# Patient Record
Sex: FEMALE | Race: BLACK OR AFRICAN AMERICAN | NOT HISPANIC OR LATINO | Employment: FULL TIME | URBAN - METROPOLITAN AREA
[De-identification: names, ages, dates, MRNs, and addresses within clinical notes are randomized per-mention and may not be internally consistent; named-entity substitution may affect disease eponyms.]

---

## 2018-01-23 ENCOUNTER — HOSPITAL ENCOUNTER (EMERGENCY)
Facility: OTHER | Age: 28
Discharge: HOME | End: 2018-01-23
Attending: EMERGENCY MEDICINE
Payer: COMMERCIAL

## 2018-01-23 VITALS
BODY MASS INDEX: 42.51 KG/M2 | HEIGHT: 62 IN | SYSTOLIC BLOOD PRESSURE: 124 MMHG | DIASTOLIC BLOOD PRESSURE: 72 MMHG | RESPIRATION RATE: 18 BRPM | WEIGHT: 231 LBS | OXYGEN SATURATION: 100 % | HEART RATE: 118 BPM | TEMPERATURE: 98 F

## 2018-01-23 DIAGNOSIS — J40 BRONCHITIS: ICD-10-CM

## 2018-01-23 DIAGNOSIS — E87.6 HYPOKALEMIA: ICD-10-CM

## 2018-01-23 DIAGNOSIS — J18.9 PNEUMONIA OF LEFT LOWER LOBE DUE TO INFECTIOUS ORGANISM: Primary | ICD-10-CM

## 2018-01-23 LAB
ALBUMIN SERPL-MCNC: 3.6 G/DL (ref 3.3–5.5)
ALP SERPL-CCNC: 84 U/L (ref 42–141)
B-HCG UR QL: NEGATIVE
BILIRUB SERPL-MCNC: 1.6 MG/DL (ref 0.2–1.6)
BILIRUBIN, POC UA: NEGATIVE
BLOOD, POC UA: ABNORMAL
BUN SERPL-MCNC: 11 MG/DL (ref 7–22)
CALCIUM SERPL-MCNC: 9.2 MG/DL (ref 8–10.3)
CHLORIDE SERPL-SCNC: 103 MMOL/L (ref 98–108)
CLARITY, POC UA: CLEAR
COLOR, POC UA: YELLOW
CREAT SERPL-MCNC: 1 MG/DL (ref 0.6–1.2)
CTP QC/QA: YES
CTP QC/QA: YES
FLUAV AG NPH QL: NEGATIVE
FLUBV AG NPH QL: NEGATIVE
GLUCOSE SERPL-MCNC: 143 MG/DL (ref 73–118)
GLUCOSE, POC UA: NEGATIVE
HCO3 UR-SCNC: 27.3 MMOL/L (ref 24–28)
KETONES, POC UA: NEGATIVE
LDH SERPL L TO P-CCNC: 1.57 MMOL/L (ref 0.5–2.2)
LEUKOCYTE EST, POC UA: NEGATIVE
NITRITE, POC UA: NEGATIVE
PCO2 BLDA: 36.6 MMHG (ref 35–45)
PH SMN: 7.48 [PH] (ref 7.35–7.45)
PH UR STRIP: 5.5 [PH]
PO2 BLDA: 113 MMHG (ref 40–60)
POC ALT (SGPT): 24 U/L (ref 10–47)
POC AST (SGOT): 19 U/L (ref 11–38)
POC BE: 4 MMOL/L
POC SATURATED O2: 99 % (ref 95–100)
POC TCO2: 25 MMOL/L (ref 18–33)
POC TCO2: 28 MMOL/L (ref 24–29)
POTASSIUM BLD-SCNC: 3 MMOL/L (ref 3.6–5.1)
PROTEIN, POC UA: ABNORMAL
PROTEIN, POC: 7.8 G/DL (ref 6.4–8.1)
SAMPLE: ABNORMAL
SODIUM BLD-SCNC: 140 MMOL/L (ref 128–145)
SPECIFIC GRAVITY, POC UA: 1.01
UROBILINOGEN, POC UA: 0.2 E.U./DL

## 2018-01-23 PROCEDURE — 85025 COMPLETE CBC W/AUTO DIFF WBC: CPT

## 2018-01-23 PROCEDURE — 81025 URINE PREGNANCY TEST: CPT | Performed by: NURSE PRACTITIONER

## 2018-01-23 PROCEDURE — 96361 HYDRATE IV INFUSION ADD-ON: CPT

## 2018-01-23 PROCEDURE — 80053 COMPREHEN METABOLIC PANEL: CPT

## 2018-01-23 PROCEDURE — 81003 URINALYSIS AUTO W/O SCOPE: CPT

## 2018-01-23 PROCEDURE — 63600175 PHARM REV CODE 636 W HCPCS: Performed by: NURSE PRACTITIONER

## 2018-01-23 PROCEDURE — 87804 INFLUENZA ASSAY W/OPTIC: CPT

## 2018-01-23 PROCEDURE — 99284 EMERGENCY DEPT VISIT MOD MDM: CPT | Mod: 25

## 2018-01-23 PROCEDURE — 82803 BLOOD GASES ANY COMBINATION: CPT

## 2018-01-23 PROCEDURE — 96374 THER/PROPH/DIAG INJ IV PUSH: CPT

## 2018-01-23 PROCEDURE — 25000003 PHARM REV CODE 250: Performed by: NURSE PRACTITIONER

## 2018-01-23 RX ORDER — PROMETHAZINE HYDROCHLORIDE AND DEXTROMETHORPHAN HYDROBROMIDE 6.25; 15 MG/5ML; MG/5ML
5 SYRUP ORAL EVERY 6 HOURS PRN
Qty: 120 ML | Refills: 0 | Status: SHIPPED | OUTPATIENT
Start: 2018-01-23 | End: 2021-12-09

## 2018-01-23 RX ORDER — CEFTRIAXONE 1 G/1
INJECTION, POWDER, FOR SOLUTION INTRAMUSCULAR; INTRAVENOUS
Status: DISCONTINUED
Start: 2018-01-23 | End: 2018-01-23 | Stop reason: HOSPADM

## 2018-01-23 RX ORDER — AZITHROMYCIN 250 MG/1
250 TABLET, FILM COATED ORAL DAILY
Qty: 6 TABLET | Refills: 0 | Status: SHIPPED | OUTPATIENT
Start: 2018-01-23 | End: 2021-12-09

## 2018-01-23 RX ORDER — POTASSIUM CHLORIDE 20 MEQ/1
40 TABLET, EXTENDED RELEASE ORAL
Status: COMPLETED | OUTPATIENT
Start: 2018-01-23 | End: 2018-01-23

## 2018-01-23 RX ORDER — CEFTRIAXONE 1 G/1
1 INJECTION, POWDER, FOR SOLUTION INTRAMUSCULAR; INTRAVENOUS
Status: COMPLETED | OUTPATIENT
Start: 2018-01-23 | End: 2018-01-23

## 2018-01-23 RX ADMIN — POTASSIUM CHLORIDE 40 MEQ: 1500 TABLET, EXTENDED RELEASE ORAL at 03:01

## 2018-01-23 RX ADMIN — CEFTRIAXONE SODIUM 1 G: 1 INJECTION, POWDER, FOR SOLUTION INTRAMUSCULAR; INTRAVENOUS at 03:01

## 2018-01-23 RX ADMIN — SODIUM CHLORIDE 1000 ML: 0.9 INJECTION, SOLUTION INTRAVENOUS at 03:01

## 2018-01-23 NOTE — ED PROVIDER NOTES
"Encounter Date: 1/23/2018       History     Chief Complaint   Patient presents with    Cough     pt states "I've been having a cough, chills and body aches since yesterday"    Chills    Generalized Body Aches     The history is provided by the patient. No  was used.   Cough   This is a new problem. The current episode started yesterday. The problem occurs constantly. The problem has been gradually worsening. The cough is productive of sputum. There has been no fever. Associated symptoms include chest pain (to left chest wall when coughing only) and myalgias. Pertinent negatives include no sweats, no weight loss, no ear congestion, no ear pain, no headaches, no rhinorrhea, no sore throat, no shortness of breath, no wheezing and no eye redness. Associated symptoms comments: Body aches and chills.     Review of patient's allergies indicates:  No Known Allergies  Past Medical History:   Diagnosis Date    Genital herpes      Past Surgical History:   Procedure Laterality Date    CERVICAL DISCECTOMY       History reviewed. No pertinent family history.  Social History   Substance Use Topics    Smoking status: Never Smoker    Smokeless tobacco: Not on file    Alcohol use No     Review of Systems   Constitutional: Negative.  Negative for appetite change, fever and weight loss.   HENT: Negative.  Negative for congestion, dental problem, ear discharge, ear pain, hearing loss, mouth sores, rhinorrhea, sore throat and trouble swallowing.    Eyes: Negative.  Negative for pain, discharge and redness.   Respiratory: Positive for cough. Negative for shortness of breath and wheezing.    Cardiovascular: Positive for chest pain (to left chest wall when coughing only).   Gastrointestinal: Negative.  Negative for abdominal distention, abdominal pain, constipation, diarrhea, nausea, rectal pain and vomiting.   Endocrine: Negative.    Genitourinary: Negative.  Negative for dyspareunia, dysuria, hematuria, vaginal " bleeding, vaginal discharge and vaginal pain.   Musculoskeletal: Positive for myalgias. Negative for back pain and neck pain.   Skin: Negative.  Negative for rash.   Allergic/Immunologic: Negative.    Neurological: Negative.  Negative for facial asymmetry, speech difficulty, light-headedness and headaches.   Hematological: Negative.    Psychiatric/Behavioral: Negative.  Negative for agitation, dysphoric mood and sleep disturbance.   All other systems reviewed and are negative.      Physical Exam     Initial Vitals [01/23/18 1411]   BP Pulse Resp Temp SpO2   124/72 (!) 118 18 98.4 °F (36.9 °C) 100 %      MAP       89.33         Physical Exam    Nursing note and vitals reviewed.  Constitutional: She appears well-developed and well-nourished. She is not diaphoretic.  Non-toxic appearance. She does not appear ill. No distress.   HENT:   Head: Normocephalic and atraumatic.   Nose: Mucosal edema present. No rhinorrhea. Right sinus exhibits no maxillary sinus tenderness and no frontal sinus tenderness. Left sinus exhibits no maxillary sinus tenderness and no frontal sinus tenderness.   Mouth/Throat: Oropharynx is clear and moist. No oropharyngeal exudate.   Eyes: Conjunctivae are normal. Right eye exhibits no discharge. Left eye exhibits no discharge.   Neck: Normal range of motion.   Cardiovascular: Normal rate, regular rhythm, normal heart sounds and intact distal pulses. Exam reveals no gallop and no friction rub.    No murmur heard.  Pulmonary/Chest: Breath sounds normal. No respiratory distress. She has no wheezes. She has no rhonchi. She has no rales. She exhibits no tenderness.   Musculoskeletal: Normal range of motion.   Neurological: She is alert and oriented to person, place, and time.   Skin: Skin is warm and dry. No rash noted.   Psychiatric: She has a normal mood and affect. Her behavior is normal. Judgment and thought content normal.       Imaging Results          X-Ray Chest PA And Lateral (Final result)   Result time 01/23/18 14:59:15    Final result by Sharon Ordonez MD (01/23/18 14:59:15)                 Impression:    Left lower lobe consolidation, most suggestive of pneumonia.  Followup is recommended after symptom resolution.      Electronically signed by: SHARON ORDONEZ MD  Date:     01/23/18  Time:    14:59              Narrative:    EXAM:  2 VIEW CHEST RADIOGRAPH.     CLINICAL INDICATION:  Bronchitis.    TECHNIQUE:  PA and lateral views of the chest were obtained.     COMPARISON: none    FINDINGS: The mediastinal structures are midline.  The cardiac silhouette not enlarged.  There is ill-defined opacity at the left lung base with obscuration of the left hemidiaphragm suggesting left lower lobe pneumonia.  No osseous abnormalities are identified.                              ED Course   Procedures  Labs Reviewed   POCT URINALYSIS W/O SCOPE - Abnormal; Notable for the following:        Result Value    Glucose, UA Negative (*)     Bilirubin, UA Negative (*)     Ketones, UA Negative (*)     Blood, UA Trace-lysed (*)     Protein, UA 1+ (*)     Nitrite, UA Negative (*)     Leukocytes, UA Negative (*)     All other components within normal limits   ISTAT PROCEDURE - Abnormal; Notable for the following:     POC PH 7.482 (*)     POC PO2 113 (*)     All other components within normal limits   POCT CMP - Abnormal; Notable for the following:     POC Glucose 143 (*)     POC Potassium 3.0 (*)     Bilirubin 1.6 (*)     All other components within normal limits   POCT INFLUENZA A/B   POCT URINE PREGNANCY   POCT CBC   POCT URINALYSIS W/O SCOPE   POCT CMP             Medical Decision Making:   Initial Assessment:   Left lower lobe pneumonia, hypokalemia  Differential Diagnosis:   URI, bronchitis, influenza  Clinical Tests:   Lab Tests: Ordered and Reviewed       <> Summary of Lab: Rapid flu negative  Radiological Study: Ordered and Reviewed  ED Management:  Repeat pulse 83 BPM.  Rocephin IV and normal saline liter  bolus IV given to the patient in the ER.  40 mEq of potassium given to the patient in the ER.  The patient will be discharged home on azithromycin pack and promethazine DM cough syrup with instructions to follow-up with her primary care provider tomorrow, drink plenty of fluids to loosen secretions, take Tylenol and over-the-counter Motrin as needed for fever/body aches, rest, take over-the-counter Mucinex and return to the ER as needed if symptoms worsen or fail to improve.  The patient is also instructed to take a daily multivitamin to prevent hypokalemia.  The patient verbalized an understanding of discharge instructions and treatment plan.                   ED Course      Clinical Impression:   The primary encounter diagnosis was Pneumonia of left lower lobe due to infectious organism. Diagnoses of Bronchitis and Hypokalemia were also pertinent to this visit.                     Toussaint Battley III, P  01/23/18 3062

## 2018-03-24 ENCOUNTER — HOSPITAL ENCOUNTER (EMERGENCY)
Facility: OTHER | Age: 28
Discharge: HOME | End: 2018-03-24
Attending: EMERGENCY MEDICINE
Payer: COMMERCIAL

## 2018-03-24 VITALS
DIASTOLIC BLOOD PRESSURE: 76 MMHG | RESPIRATION RATE: 16 BRPM | SYSTOLIC BLOOD PRESSURE: 122 MMHG | OXYGEN SATURATION: 100 % | HEART RATE: 76 BPM | HEIGHT: 63 IN | BODY MASS INDEX: 44.12 KG/M2 | WEIGHT: 249 LBS | TEMPERATURE: 98 F

## 2018-03-24 DIAGNOSIS — N94.6 DYSMENORRHEA: Primary | ICD-10-CM

## 2018-03-24 LAB
B-HCG UR QL: NEGATIVE
CTP QC/QA: YES

## 2018-03-24 PROCEDURE — 99283 EMERGENCY DEPT VISIT LOW MDM: CPT

## 2018-03-24 PROCEDURE — 81025 URINE PREGNANCY TEST: CPT | Performed by: EMERGENCY MEDICINE

## 2018-03-24 RX ORDER — LABETALOL 100 MG/1
100 TABLET, FILM COATED ORAL 2 TIMES DAILY
COMMUNITY
End: 2021-12-09

## 2018-03-24 RX ORDER — FEXOFENADINE HCL 60 MG
60 TABLET ORAL DAILY
COMMUNITY

## 2018-03-24 RX ORDER — IBUPROFEN 800 MG/1
800 TABLET ORAL EVERY 6 HOURS PRN
Qty: 20 TABLET | Refills: 0 | Status: SHIPPED | OUTPATIENT
Start: 2018-03-24 | End: 2019-07-15 | Stop reason: SDUPTHER

## 2018-03-25 NOTE — ED PROVIDER NOTES
"Encounter Date: 3/24/2018       History     Chief Complaint   Patient presents with    Abdominal Pain     Pt states, " I think I may be pregnant and I might have some stomach pain."     Missed period that was supposed to come 3/16/18 but states period stated this morning.       The history is provided by the patient.   Female  Problem   Primary symptoms include vaginal bleeding.    Primary symptoms include no fever, no dysuria.   Primary symptoms comment: started period today with mild cramps.. This is a new problem. The current episode started today. The problem occurs intermittently. The problem has been waxing and waning. Pertinent negatives include no abdominal pain, no constipation, no diarrhea, no nausea, no vomiting, no frequency, no light-headedness and no dizziness.     Review of patient's allergies indicates:  No Known Allergies  Past Medical History:   Diagnosis Date    Genital herpes      Past Surgical History:   Procedure Laterality Date    CERVICAL DISCECTOMY       No family history on file.  Social History   Substance Use Topics    Smoking status: Never Smoker    Smokeless tobacco: Not on file    Alcohol use No     Review of Systems   Constitutional: Negative for fever.   Gastrointestinal: Negative for abdominal pain, constipation, diarrhea, nausea and vomiting.   Genitourinary: Positive for menstrual problem and vaginal bleeding. Negative for decreased urine volume, dysuria, frequency and hematuria.        Menstrual cramps     Neurological: Negative for dizziness, weakness and light-headedness.   All other systems reviewed and are negative.      Physical Exam     Initial Vitals [03/24/18 1843]   BP Pulse Resp Temp SpO2   (!) 153/108 88 16 98 °F (36.7 °C) 98 %      MAP       123         Physical Exam    Nursing note and vitals reviewed.  Constitutional: She appears well-developed and well-nourished. She is not diaphoretic. No distress.   HENT:   Head: Normocephalic and atraumatic. "   Mouth/Throat: Oropharynx is clear and moist. No oropharyngeal exudate.   Eyes: Conjunctivae are normal. Pupils are equal, round, and reactive to light.   Neck: Normal range of motion. Neck supple.   Cardiovascular: Normal rate and intact distal pulses.   Pulmonary/Chest: No accessory muscle usage or stridor. No tachypnea. No respiratory distress.   Abdominal: Soft. Bowel sounds are normal. She exhibits no distension. There is no tenderness. There is no rebound and no guarding.   Musculoskeletal: Normal range of motion. She exhibits no tenderness.   Neurological: She is alert and oriented to person, place, and time.   Skin: Skin is warm. Capillary refill takes less than 2 seconds.   Psychiatric: She has a normal mood and affect.         ED Course   Procedures  Labs Reviewed   POCT URINE PREGNANCY                             Labs Reviewed  Admission on 03/24/2018, Discharged on 03/24/2018   Component Date Value Ref Range Status    POC Preg Test, Ur 03/24/2018 Negative  Negative Final     Acceptable 03/24/2018 Yes   Final             Note was created using voice recognition software. Note may have occasional typographical errors that may not have been identified and edited despite good yoni initial review prior to signing.        Discharge Medications     Discharge Medication List as of 3/24/2018  9:18 PM      START taking these medications    Details   ibuprofen (ADVIL,MOTRIN) 800 MG tablet Take 1 tablet (800 mg total) by mouth every 6 (six) hours as needed for Pain., Starting Sat 3/24/2018, Print         CONTINUE these medications which have NOT CHANGED    Details   fexofenadine (ALLEGRA) 60 MG tablet Take 60 mg by mouth once daily., Historical Med      labetalol (NORMODYNE) 100 MG tablet Take 100 mg by mouth 2 (two) times daily., Historical Med      acyclovir (ZOVIRAX) 400 MG tablet Take by mouth 2 (two) times daily., Until Discontinued, Historical Med      azithromycin (Z-SALUD) 250 MG tablet Take  "1 tablet (250 mg total) by mouth once daily. Take first 2 tablets together, then 1 every day until finished., Starting Tue 1/23/2018, Print      famotidine (PEPCID) 20 MG tablet Take 1 tablet (20 mg total) by mouth 2 (two) times daily as needed for Heartburn., Starting 4/17/2016, Until Mon 4/17/17, Print      NORETHINDRONE-E.ESTRADIOL-IRON (LO LOESTRIN FE ORAL) Take by mouth., Until Discontinued, Historical Med      promethazine-dextromethorphan (PROMETHAZINE-DM) 6.25-15 mg/5 mL Syrp Take 5 mLs by mouth every 6 (six) hours as needed (cough)., Starting Tue 1/23/2018, Print                 Vitals:    03/24/18 1843 03/24/18 2136   BP: (!) 153/108 122/76   BP Location: Right arm    Patient Position: Sitting    Pulse: 88 76   Resp: 16    Temp: 98 °F (36.7 °C) 98.2 °F (36.8 °C)   TempSrc: Oral    SpO2: 98% 100%   Weight: 112.9 kg (249 lb)    Height: 5' 3" (1.6 m)          Patient discharged to home in stable condition with instructions to:   1. Please take all meds as prescribed.  2. Follow-up with your primary care doctor   3. Return precautions discussed and patient and/or family/caretaker understands to return to the emergency room for any concerns including worsening of your current symptoms, fever, chills, night sweats, worsening pain, chest pain, shortness of breath, nausea, vomiting, diarrhea, bleeding, headache, difficulty talking, visual disturbances, weakness, numbness or any other acute concerns       Clinical Impression:   The encounter diagnosis was Dysmenorrhea.                           Murphy Millan MD  04/29/18 1440    "

## 2018-03-25 NOTE — ED NOTES
After Visit Summary   7/11/2017    Letty High    MRN: [id number]           Patient Information     Date Of Birth          1992        Visit Information        Provider Department      7/11/2017 5:30 PM Debo Richardson APRN CNP [facility]        Today's Diagnoses     Oral contraceptive pill surveillance    -  1      Care Instructions    Primary [facility] Center Medication Refill Request Information:  * Please contact your pharmacy regarding ANY request for medication refills.  ** [facility] Prescription Fax = [phone]  * Please allow 3 business days for routine medication refills.  * Please allow 5 business days for controlled substance medication refills.     Primary [facility] Center Test Result notification information:  *You will be notified with in 7-10 days of your appointment day regarding the results of your test.  If you are on MyChart you will be notified as soon as the provider has reviewed the results and signed off on them.    Primary Care Center [phone]             Follow-ups after your visit        Your next 10 appointments already scheduled     Aug 14, 2017  2:30 PM CDT   Nurse Visit with  [facility] Nurse   [facility] ([facility])    [address] [place] [id number] [phone]              Who to contact     Please call your clinic at [phone] to:    Ask questions about your health    Make or cancel appointments    Discuss your medicines    Learn about your test results    Speak to your doctor   If you have compliments or concerns about an experience at your clinic, or if you wish to file a complaint, please contact [facility] Patient Relations at [phone] or email us at [email or web address]         Additional Information About Your Visit        MyChart Information     [email or web address] is an electronic gateway that provides easy, online access to your  Pt states that she had some mild pelvic pain and didn't know if she might be pregnant because she had a period with her last baby. Pt's period came down last night. Denies any urinary symptoms, denies constipation, and denies pain at this time.   medical records. With [facility], you can request a clinic appointment, read your test results, renew a prescription or communicate with your care team.     To sign up for [facility] visit the website at [email or web address]   You will be asked to enter the access code listed below, as well as some personal information. Please follow the directions to create your username and password.     Your access code is: [email or web address]  Expires: 2017  6:30 AM     Your access code will  in 90 days. If you need help or a new code, please contact your [facility] or call [phone] for assistance.        Care EveryWhere ID     This is your Care EveryWhere ID. This could be used by other organizations to access your [facility] medical records  [id number]        Your Vitals Were     Pulse Breastfeeding?                76 No           Blood Pressure from Last 3 Encounters:   17 126/85   17 133/76   17 119/79    Weight from Last 3 Encounters:   17 77.8 kg (171 lb 9.6 oz)   17 77.8 kg (171 lb 9.6 oz)              Today, you had the following     No orders found for display         Where to get your medicines      These medications were sent to [facility]/pharmacy #[id number] - [place], [place] -  [address], [place] [address]     Phone:  [phone]     norethindrone-ethinyl estradiol 1-20 MG-MCG per tablet          Primary Care Provider Office Phone # Fax #    Debo Amita Richardson, APRN [facility] [phone]       [address] [address]        Equal Access to Services     [facility] CenterKIRSTIE : Hadii jessica Carlton, wadeniseda luqadaha, qanaomyta kaalmada dane robles . So [facility] [phone].    ATENCIÓN: Si habla español, tiene a howell disposición servicios gratuitos de asistencia lingüística. Llame al [phone].    We comply with applicable federal civil rights laws  and [place] laws. We do not discriminate on the basis of race, color, national origin, age, disability sex, sexual orientation or gender identity.            Thank you!     Thank you for choosing [facility]  for your care. Our goal is always to provide you with excellent care. Hearing back from our patients is one way we can continue to improve our services. Please take a few minutes to complete the written survey that you may receive in the mail after your visit with us. Thank you!             Your Updated Medication List - Protect others around you: Learn how to safely use, store and throw away your medicines at www.disposemymeds.org.          This list is accurate as of: 7/11/17  5:52 PM.  Always use your most recent med list.                   Brand Name Dispense Instructions for use Diagnosis    busPIRone 10 MG tablet    BUSPAR    180 tablet    Take 1 tablet (10 mg) by mouth 2 times daily    Generalized anxiety disorder       citalopram 20 MG tablet    celeXA    90 tablet    Take 1 tablet (20 mg) by mouth daily    Severe episode of recurrent major depressive disorder, without psychotic features (H), Generalized anxiety disorder       norethindrone-ethinyl estradiol 1-20 MG-MCG per tablet    MICROGESTIN 1/20    63 tablet    Take 1 tablet by mouth daily    Oral contraceptive pill surveillance

## 2018-05-06 ENCOUNTER — HOSPITAL ENCOUNTER (EMERGENCY)
Facility: HOSPITAL | Age: 28
Discharge: HOME | End: 2018-05-06
Attending: EMERGENCY MEDICINE
Payer: COMMERCIAL

## 2018-05-06 VITALS
OXYGEN SATURATION: 99 % | BODY MASS INDEX: 42.88 KG/M2 | TEMPERATURE: 98 F | DIASTOLIC BLOOD PRESSURE: 97 MMHG | WEIGHT: 233 LBS | SYSTOLIC BLOOD PRESSURE: 140 MMHG | HEART RATE: 100 BPM | RESPIRATION RATE: 16 BRPM | HEIGHT: 62 IN

## 2018-05-06 DIAGNOSIS — R10.9 ABDOMINAL CRAMPING: ICD-10-CM

## 2018-05-06 DIAGNOSIS — R19.7 DIARRHEA, UNSPECIFIED TYPE: Primary | ICD-10-CM

## 2018-05-06 LAB
B-HCG UR QL: NEGATIVE
CTP QC/QA: YES

## 2018-05-06 PROCEDURE — 99283 EMERGENCY DEPT VISIT LOW MDM: CPT

## 2018-05-06 PROCEDURE — 81025 URINE PREGNANCY TEST: CPT | Performed by: EMERGENCY MEDICINE

## 2018-05-06 PROCEDURE — 25000003 PHARM REV CODE 250: Performed by: EMERGENCY MEDICINE

## 2018-05-06 RX ORDER — DICYCLOMINE HYDROCHLORIDE 20 MG/1
20 TABLET ORAL EVERY 6 HOURS
Qty: 30 TABLET | Refills: 0 | Status: SHIPPED | OUTPATIENT
Start: 2018-05-06 | End: 2021-12-09

## 2018-05-06 RX ORDER — DICYCLOMINE HYDROCHLORIDE 10 MG/1
20 CAPSULE ORAL
Status: COMPLETED | OUTPATIENT
Start: 2018-05-06 | End: 2018-05-06

## 2018-05-06 RX ORDER — DIPHENOXYLATE HYDROCHLORIDE AND ATROPINE SULFATE 2.5; .025 MG/1; MG/1
1 TABLET ORAL
Status: COMPLETED | OUTPATIENT
Start: 2018-05-06 | End: 2018-05-06

## 2018-05-06 RX ADMIN — DIPHENOXYLATE HYDROCHLORIDE AND ATROPINE SULFATE 1 TABLET: 2.5; .025 TABLET ORAL at 01:05

## 2018-05-06 RX ADMIN — DICYCLOMINE HYDROCHLORIDE 20 MG: 10 CAPSULE ORAL at 01:05

## 2018-05-06 NOTE — ED NOTES
Neuro: AAOx3  Resp: Airway patent, respirations even/unlabored  Cardiac: Skin pink/warm/dry, pulses intact  Abdomen: Soft, non-tender to palpation  Musculoskeletal: Moves all extremities equally, ROM intact  Pt reports diarrhea since Friday and some nausea today, denies pain

## 2018-06-29 ENCOUNTER — HOSPITAL ENCOUNTER (EMERGENCY)
Facility: HOSPITAL | Age: 28
Discharge: HOME | End: 2018-06-29
Attending: EMERGENCY MEDICINE
Payer: COMMERCIAL

## 2018-06-29 VITALS
HEART RATE: 61 BPM | WEIGHT: 229 LBS | SYSTOLIC BLOOD PRESSURE: 103 MMHG | BODY MASS INDEX: 40.57 KG/M2 | RESPIRATION RATE: 18 BRPM | OXYGEN SATURATION: 99 % | TEMPERATURE: 98 F | DIASTOLIC BLOOD PRESSURE: 61 MMHG | HEIGHT: 63 IN

## 2018-06-29 DIAGNOSIS — S70.02XA CONTUSION OF LEFT HIP, INITIAL ENCOUNTER: ICD-10-CM

## 2018-06-29 DIAGNOSIS — M54.32 SCIATICA, LEFT SIDE: ICD-10-CM

## 2018-06-29 DIAGNOSIS — B35.3 TINEA PEDIS, RIGHT: Primary | ICD-10-CM

## 2018-06-29 PROCEDURE — 99283 EMERGENCY DEPT VISIT LOW MDM: CPT

## 2018-06-29 RX ORDER — KETOROLAC TROMETHAMINE 10 MG/1
10 TABLET, FILM COATED ORAL EVERY 6 HOURS
Qty: 20 TABLET | Refills: 0 | Status: SHIPPED | OUTPATIENT
Start: 2018-06-29 | End: 2021-12-09

## 2018-06-29 RX ORDER — KETOCONAZOLE 20 MG/G
CREAM TOPICAL 2 TIMES DAILY
Qty: 60 G | Refills: 0 | Status: SHIPPED | OUTPATIENT
Start: 2018-06-29 | End: 2021-12-09

## 2018-06-29 NOTE — ED PROVIDER NOTES
Encounter Date: 6/29/2018       History     Chief Complaint   Patient presents with    Leg Pain     pt reports bilat leg pain since falling 1 month ago, reports right foot swelling since yesterday    Foot Pain     Chief complaint:  Toes  swelling  27-year-old says that her toes were swelling yesterday on her right foot but today the swelling has resolved.  She denies trauma. She says that she wears the same shoes every day.  She denies weakness or numbness or color change.  Patient also has occasional pain to her left buttock that radiates down her leg.  This has been ongoing since she fell a month ago after she tripped.  Patient did not go to the emergency room at that time.  She has been ambulating without difficulty.  She did not have pain to the site today.      The history is provided by the patient.     Review of patient's allergies indicates:  No Known Allergies  Past Medical History:   Diagnosis Date    Genital herpes      Past Surgical History:   Procedure Laterality Date    CERVICAL DISCECTOMY       History reviewed. No pertinent family history.  Social History   Substance Use Topics    Smoking status: Never Smoker    Smokeless tobacco: Not on file    Alcohol use No     Review of Systems   Musculoskeletal: Negative for gait problem and neck pain.        Left leg pain and right foot swelling   Skin: Negative for color change.   Neurological: Negative for weakness, numbness and headaches.       Physical Exam     Initial Vitals [06/29/18 1141]   BP Pulse Resp Temp SpO2   103/61 61 18 98 °F (36.7 °C) 99 %      MAP       --         Physical Exam    Constitutional: No distress.   Cardiovascular: Intact distal pulses.   Pulmonary/Chest: No respiratory distress.   Musculoskeletal: Normal range of motion. She exhibits no edema or tenderness.        Legs:       Feet:    No tenderness to right foot, FROM left hip   Neurological: She is alert and oriented to person, place, and time. She has normal strength. No  sensory deficit.   Skin: No erythema.   Psychiatric: She has a normal mood and affect.         ED Course   Procedures  Labs Reviewed - No data to display       Imaging Results    None          Medical Decision Making:   Initial Assessment:   27-year-old who complains of a swelling to her right foot that occurred yesterday and has resolved.  On exam patient has evidence of tinea Pedia to the right foot.  She also complained of pain to her left hip for the last month.  Patient has full range of motion and only mild tenderness to her buttock area.  No neurovascular deficits  ED Management:  I suspect patient has tinea pedis to the right foot as well as mild and occasional sciatica to the left hip.  Patient will be treated with Nizoral cream and Toradol.                      Clinical Impression:   The primary encounter diagnosis was Tinea pedis, right. Diagnoses of Contusion of left hip, initial encounter and Sciatica, left side were also pertinent to this visit.                             Autumn Epperson MD  06/29/18 0545

## 2019-03-12 ENCOUNTER — HOSPITAL ENCOUNTER (EMERGENCY)
Facility: HOSPITAL | Age: 29
Discharge: HOME | End: 2019-03-12
Attending: EMERGENCY MEDICINE
Payer: COMMERCIAL

## 2019-03-12 VITALS
TEMPERATURE: 98 F | OXYGEN SATURATION: 98 % | DIASTOLIC BLOOD PRESSURE: 88 MMHG | BODY MASS INDEX: 40.74 KG/M2 | RESPIRATION RATE: 16 BRPM | WEIGHT: 230 LBS | HEART RATE: 90 BPM | SYSTOLIC BLOOD PRESSURE: 140 MMHG

## 2019-03-12 DIAGNOSIS — L73.9 FOLLICULITIS: Primary | ICD-10-CM

## 2019-03-12 LAB
B-HCG UR QL: NEGATIVE
CTP QC/QA: YES

## 2019-03-12 PROCEDURE — 81025 URINE PREGNANCY TEST: CPT | Mod: ER | Performed by: EMERGENCY MEDICINE

## 2019-03-12 PROCEDURE — 99283 EMERGENCY DEPT VISIT LOW MDM: CPT | Mod: 25,ER

## 2019-03-12 RX ORDER — ERYTHROMYCIN 5 MG/G
OINTMENT OPHTHALMIC
Qty: 1 TUBE | Refills: 0 | Status: SHIPPED | OUTPATIENT
Start: 2019-03-12 | End: 2019-04-20 | Stop reason: SDUPTHER

## 2019-03-12 NOTE — ED PROVIDER NOTES
"Encounter Date: 3/12/2019       History     Chief Complaint   Patient presents with    Eye Pain     Pt states," I had congestion over the weekend and sunday my eyes started hurting and swelling with pain. I see fine they just hurt."     Patient presents with pain and swelling to right upper eyelid where she appears to have folliculitis.  Patient states that started swelling a couple days ago after she had a  sinus infection.  Patient states everything else has cleared up but I will it is still inflamed          Review of patient's allergies indicates:  No Known Allergies  Past Medical History:   Diagnosis Date    Genital herpes      Past Surgical History:   Procedure Laterality Date    CERVICAL DISCECTOMY       History reviewed. No pertinent family history.  Social History     Tobacco Use    Smoking status: Never Smoker    Smokeless tobacco: Never Used   Substance Use Topics    Alcohol use: No    Drug use: No     Review of Systems   Constitutional: Negative for fever.   HENT: Negative for sore throat.    Eyes: Positive for pain and redness.   Respiratory: Negative for shortness of breath.    Cardiovascular: Negative for chest pain.   Gastrointestinal: Negative for nausea.   Genitourinary: Negative for dysuria.   Musculoskeletal: Negative for back pain.   Skin: Negative for rash.   Neurological: Negative for weakness.   Hematological: Does not bruise/bleed easily.   All other systems reviewed and are negative.      Physical Exam     Initial Vitals [03/12/19 1141]   BP Pulse Resp Temp SpO2   (!) 140/88 90 16 98 °F (36.7 °C) 98 %      MAP       --         Physical Exam    Nursing note and vitals reviewed.  Constitutional: Vital signs are normal. She appears well-developed and well-nourished. She is not diaphoretic. She is cooperative.   HENT:   Head: Normocephalic and atraumatic.   Right Ear: External ear normal.   Left Ear: External ear normal.   Nose: Nose normal.   Mouth/Throat: Oropharynx is clear and moist. "   Eyes: Conjunctivae, EOM and lids are normal. Pupils are equal, round, and reactive to light. Right eye exhibits no discharge. No scleral icterus.   Swelling noted to right upper eyelid were patient appears to have folliculitis.  No discharge noted   Neck: Trachea normal, normal range of motion and full passive range of motion without pain. Neck supple. No thyromegaly present. No tracheal deviation and normal range of motion present. No neck rigidity. No Brudzinski's sign noted. No JVD present.   Cardiovascular: Normal rate, regular rhythm, normal heart sounds, intact distal pulses and normal pulses. Exam reveals no gallop and no friction rub.    No murmur heard.  Pulmonary/Chest: Effort normal and breath sounds normal. No stridor. No tachypnea. No respiratory distress. She has no wheezes. She has no rhonchi. She has no rales. She exhibits no tenderness.   Abdominal: Soft. Normal appearance and bowel sounds are normal. She exhibits no distension and no mass. There is no tenderness. There is no rebound and no guarding.   Musculoskeletal: Normal range of motion. She exhibits no edema or tenderness.   Lymphadenopathy:     She has no cervical adenopathy.     She has no axillary adenopathy.   Neurological: She is alert and oriented to person, place, and time. She has normal strength and normal reflexes.   Skin: Skin is warm, dry and intact. Capillary refill takes less than 2 seconds. No rash noted. No erythema.   Psychiatric: She has a normal mood and affect. Her speech is normal and behavior is normal. Judgment and thought content normal. Cognition and memory are normal.         ED Course   Procedures  Labs Reviewed   POCT URINE PREGNANCY          Imaging Results    None          Medical Decision Making:   Differential Diagnosis:   Folliculitis, conjunctivitis, pinkeye                      Clinical Impression:       ICD-10-CM ICD-9-CM   1. Folliculitis L73.9 704.8                                Brinda Santos,  [facility]  03/12/19 1227

## 2019-03-13 ENCOUNTER — HOSPITAL ENCOUNTER (EMERGENCY)
Facility: HOSPITAL | Age: 29
Discharge: HOME | End: 2019-03-13
Attending: EMERGENCY MEDICINE
Payer: COMMERCIAL

## 2019-03-13 VITALS
HEIGHT: 63 IN | BODY MASS INDEX: 40.75 KG/M2 | OXYGEN SATURATION: 100 % | HEART RATE: 73 BPM | DIASTOLIC BLOOD PRESSURE: 91 MMHG | WEIGHT: 230 LBS | RESPIRATION RATE: 18 BRPM | TEMPERATURE: 99 F | SYSTOLIC BLOOD PRESSURE: 145 MMHG

## 2019-03-13 DIAGNOSIS — H00.11 CHALAZION OF RIGHT UPPER EYELID: Primary | ICD-10-CM

## 2019-03-13 PROCEDURE — 99283 EMERGENCY DEPT VISIT LOW MDM: CPT | Mod: ER

## 2019-03-13 RX ORDER — CEPHALEXIN 250 MG/1
250 CAPSULE ORAL 4 TIMES DAILY
Qty: 28 CAPSULE | Refills: 0 | Status: SHIPPED | OUTPATIENT
Start: 2019-03-13 | End: 2019-03-20

## 2019-03-13 NOTE — DISCHARGE INSTRUCTIONS
Wash your eyelid  gently with Harshad's Baby shampoo 2-3 times a day, apply warm compresses for 10 min and then apply ointment.  Call the eye doctor for close follow-up

## 2019-03-13 NOTE — ED PROVIDER NOTES
Encounter Date: 3/13/2019       History     Chief Complaint   Patient presents with    Recheck     pt reports she was treated here yest for right eye inflam. was given ointment. She is concerned about more swelling     Eye Problem     Chief complaint:  Eyelid swelling  28-year-old complains of swelling to her right upper lid.  Patient was seen here yesterday and placed on erythromycin eye ointment.  Patient has been washing her eyelid with the Baby shampoo and then applying the ointment.  She said that her pain is minimal and only hurts when she touches it.  She denies visual changes.  Patient said that she used new eye makeup recently and the problem began 2 days ago.  She denies drainage from the eye.          Review of patient's allergies indicates:  No Known Allergies  Past Medical History:   Diagnosis Date    Genital herpes      Past Surgical History:   Procedure Laterality Date    CERVICAL DISCECTOMY       History reviewed. No pertinent family history.  Social History     Tobacco Use    Smoking status: Never Smoker    Smokeless tobacco: Never Used   Substance Use Topics    Alcohol use: No    Drug use: No     Review of Systems   Constitutional: Negative for fever.   Eyes: Positive for pain (3/10). Negative for photophobia, discharge, redness, itching and visual disturbance.        Eyelid swelling       Physical Exam     Initial Vitals [03/13/19 0709]   BP Pulse Resp Temp SpO2   (!) 145/91 73 18 99.4 °F (37.4 °C) 100 %      MAP       --         Physical Exam    Nursing note and vitals reviewed.  Constitutional: No distress.   HENT:   Head: Normocephalic.   Eyes: Conjunctivae and EOM are normal. Pupils are equal, round, and reactive to light. Right eye exhibits no discharge.       Neck: Normal range of motion.   Pulmonary/Chest: No respiratory distress.   Neurological: She is alert and oriented to person, place, and time. GCS score is 15. GCS eye subscore is 4. GCS verbal subscore is 5. GCS motor subscore  is 6.   Skin: Skin is warm.   Psychiatric: She has a normal mood and affect.         ED Course   Procedures  Labs Reviewed - No data to display       Imaging Results    None          Medical Decision Making:   Initial Assessment:   28-year-old presents with swelling to her upper lid on the right.  Patient is currently on erythromycin eye ointment.  Conjunctivae normal  ED Management:  Patient was advised not only to wash I gently with Harshad's Baby shampoo but then to apply warm compresses for 10 min and then reapply the ointment.  She should also begin Keflex.                      Clinical Impression:       ICD-10-CM ICD-9-CM   1. Chalazion of right upper eyelid H00.11 373.2                                Autumn Epperson MD  03/13/19 0734

## 2019-04-05 ENCOUNTER — HOSPITAL ENCOUNTER (EMERGENCY)
Facility: HOSPITAL | Age: 29
Discharge: HOME | End: 2019-04-05
Attending: EMERGENCY MEDICINE
Payer: COMMERCIAL

## 2019-04-05 VITALS
OXYGEN SATURATION: 100 % | HEART RATE: 76 BPM | SYSTOLIC BLOOD PRESSURE: 126 MMHG | HEIGHT: 62 IN | DIASTOLIC BLOOD PRESSURE: 86 MMHG | WEIGHT: 227 LBS | BODY MASS INDEX: 41.77 KG/M2 | RESPIRATION RATE: 20 BRPM | TEMPERATURE: 98 F

## 2019-04-05 DIAGNOSIS — J02.9 PHARYNGITIS, UNSPECIFIED ETIOLOGY: Primary | ICD-10-CM

## 2019-04-05 LAB
B-HCG UR QL: NEGATIVE
CTP QC/QA: YES

## 2019-04-05 PROCEDURE — 99284 EMERGENCY DEPT VISIT MOD MDM: CPT | Mod: 25,ER

## 2019-04-05 PROCEDURE — 81025 URINE PREGNANCY TEST: CPT | Mod: ER | Performed by: EMERGENCY MEDICINE

## 2019-04-05 PROCEDURE — 96372 THER/PROPH/DIAG INJ SC/IM: CPT | Mod: ER

## 2019-04-05 PROCEDURE — 63600175 PHARM REV CODE 636 W HCPCS: Mod: ER | Performed by: EMERGENCY MEDICINE

## 2019-04-05 RX ORDER — DEXAMETHASONE SODIUM PHOSPHATE 4 MG/ML
4 INJECTION, SOLUTION INTRA-ARTICULAR; INTRALESIONAL; INTRAMUSCULAR; INTRAVENOUS; SOFT TISSUE
Status: COMPLETED | OUTPATIENT
Start: 2019-04-05 | End: 2019-04-05

## 2019-04-05 RX ADMIN — DEXAMETHASONE SODIUM PHOSPHATE 4 MG: 4 INJECTION, SOLUTION INTRAMUSCULAR; INTRAVENOUS at 11:04

## 2019-04-05 NOTE — ED PROVIDER NOTES
"Encounter Date: 4/5/2019    SCRIBE #1 NOTE: I, Marina Marina, am scribing for, and in the presence of,  Dr. Epperson. I have scribed the following portions of the note - Other sections scribed: HPI, ROS, PE.       History     Chief Complaint   Patient presents with    Sore Throat     Pt states," Sore throat for over one week. It started when I had a head cold with congestion almost two weeks ago."     CC: Sore throat  28 y.o female presents with a pain with swallowing for 5 days. She says it feels funny when she swallows and that she feels like there is a lot of saliva in her throat. She report she is able to eat and drink without difficulty. She denies vomiting but felt nauseated this morning while brushing her teeth. She also reports a productive cough with green sputum and congestion for the past week. She denies SOB, fever, or HA. She has not taken anything for pain.  Non smoker.     The history is provided by the patient. No  was used.     Review of patient's allergies indicates:  No Known Allergies  Past Medical History:   Diagnosis Date    Genital herpes      Past Surgical History:   Procedure Laterality Date    CERVICAL DISCECTOMY       History reviewed. No pertinent family history.  Social History     Tobacco Use    Smoking status: Never Smoker    Smokeless tobacco: Never Used   Substance Use Topics    Alcohol use: No    Drug use: No     Review of Systems   Constitutional: Negative for appetite change and fever.   HENT: Positive for congestion and sore throat.         Pain with swallowing    Respiratory: Positive for cough (productive with green sputum). Negative for shortness of breath.    Gastrointestinal: Positive for nausea. Negative for vomiting.   Neurological: Negative for headaches.       Physical Exam     Initial Vitals [04/05/19 1043]   BP Pulse Resp Temp SpO2   (!) 141/82 69 16 98 °F (36.7 °C) 100 %      MAP       --         Physical Exam    Nursing note and vitals " reviewed.  Constitutional: She appears well-developed and well-nourished. She is not diaphoretic. No distress.   HENT:   Head: Normocephalic and atraumatic.   Right Ear: Tympanic membrane, external ear and ear canal normal. No middle ear effusion.   Left Ear: Tympanic membrane, external ear and ear canal normal.  No middle ear effusion.   Mouth/Throat: Uvula is midline and mucous membranes are normal. Posterior oropharyngeal edema present. No oropharyngeal exudate, posterior oropharyngeal erythema or tonsillar abscesses.   Eyes: EOM are normal.   Neck: Trachea normal, normal range of motion, full passive range of motion without pain and phonation normal. Neck supple.   Cardiovascular: Normal rate, regular rhythm and normal heart sounds. Exam reveals no gallop and no friction rub.    No murmur heard.  Pulmonary/Chest: Breath sounds normal. No respiratory distress. She has no wheezes. She has no rhonchi. She has no rales.   Musculoskeletal: Normal range of motion.   Neurological: She is alert and oriented to person, place, and time. No cranial nerve deficit or sensory deficit.   Skin: Skin is warm and dry. Capillary refill takes less than 2 seconds.   Psychiatric: She has a normal mood and affect. Her behavior is normal.         ED Course   Procedures  Labs Reviewed   POCT URINE PREGNANCY          Imaging Results    None          Medical Decision Making:   Initial Assessment:   28 y.o female presents with a sore throat, productive cough, and congestion for 5 days. Physical exam findings are significant for oropharyngeal edema without erythema or exudate. No evidence of a peritonsillar abscess. Uvula midline. No respiratory distress. Phonation normal. Heart and lungs sound normal.    Clinical Tests:   Lab Tests: Ordered and Reviewed  ED Management:  I will order  dexamethasone .  Antibiotics not  necessary            Scribe Attestation:   Scribe #1: I performed the above scribed service and the documentation accurately  describes the services I performed. I attest to the accuracy of the note.       I, Dr. Autumn Epperson, personally performed the services described in this documentation. All medical record entries made by the scribe were at my direction and in my presence.  I have reviewed the chart and agree that the record reflects my personal performance and is accurate and complete. Autumn Epperson MD.  12:39 PM 04/05/2019             Clinical Impression:     1. Pharyngitis, unspecified etiology                                 Autumn Epperson MD  04/05/19 5381

## 2019-04-20 ENCOUNTER — HOSPITAL ENCOUNTER (EMERGENCY)
Facility: HOSPITAL | Age: 29
Discharge: HOME | End: 2019-04-20
Attending: EMERGENCY MEDICINE
Payer: COMMERCIAL

## 2019-04-20 VITALS
BODY MASS INDEX: 40.22 KG/M2 | SYSTOLIC BLOOD PRESSURE: 145 MMHG | DIASTOLIC BLOOD PRESSURE: 82 MMHG | OXYGEN SATURATION: 99 % | WEIGHT: 227 LBS | HEIGHT: 63 IN | HEART RATE: 87 BPM | RESPIRATION RATE: 20 BRPM | TEMPERATURE: 98 F

## 2019-04-20 DIAGNOSIS — H10.9 CONJUNCTIVITIS OF BOTH EYES, UNSPECIFIED CONJUNCTIVITIS TYPE: Primary | ICD-10-CM

## 2019-04-20 LAB
B-HCG UR QL: NEGATIVE
CTP QC/QA: YES

## 2019-04-20 PROCEDURE — 99283 EMERGENCY DEPT VISIT LOW MDM: CPT | Mod: ER

## 2019-04-20 PROCEDURE — 81025 URINE PREGNANCY TEST: CPT | Mod: ER | Performed by: EMERGENCY MEDICINE

## 2019-04-20 RX ORDER — ERYTHROMYCIN 5 MG/G
OINTMENT OPHTHALMIC
Qty: 3.5 G | Refills: 0 | Status: SHIPPED | OUTPATIENT
Start: 2019-04-20 | End: 2021-12-09

## 2019-04-21 NOTE — ED PROVIDER NOTES
Encounter Date: 4/20/2019    SCRIBE #1 NOTE: I, Marina Marina, am scribing for, and in the presence of,  Toussaintussaint Battle NP. I have scribed the following portions of the note - Other sections scribed: HPI, ROS, PE.       History     Chief Complaint   Patient presents with    eye problems     PT WITH C/O BILATERAL EYE DRAINAGE, ITCHING AND SWELLING     28 y.o female presents with acute bilateral eye itching, redness, and swelling for 4 days. She also notes bilateral eye draining.  She was seen here for similar symptoms on 3/13/19 and was prescribed antibiotics that she has since finished. She denies fever, chills, cough, ear pain, or N/V.     The history is provided by the patient. No  was used.     Review of patient's allergies indicates:  No Known Allergies  Past Medical History:   Diagnosis Date    Genital herpes      Past Surgical History:   Procedure Laterality Date    CERVICAL DISCECTOMY       History reviewed. No pertinent family history.  Social History     Tobacco Use    Smoking status: Never Smoker    Smokeless tobacco: Never Used   Substance Use Topics    Alcohol use: No    Drug use: No     Review of Systems   Constitutional: Negative.  Negative for appetite change, chills, diaphoresis and fever.   HENT: Negative.  Negative for congestion, dental problem, ear discharge, ear pain, hearing loss, mouth sores, postnasal drip, rhinorrhea, sinus pressure, sinus pain, sore throat and trouble swallowing.    Eyes: Positive for discharge (bilateral), redness (bilateral) and itching. Negative for pain.        Bilateral eye swelling    Respiratory: Negative.  Negative for cough, shortness of breath and wheezing.    Cardiovascular: Negative.  Negative for chest pain and palpitations.   Gastrointestinal: Negative.  Negative for abdominal distention, abdominal pain, constipation, diarrhea, nausea, rectal pain and vomiting.   Endocrine: Negative.    Genitourinary: Negative.  Negative for  difficulty urinating, dyspareunia, dysuria, flank pain, hematuria, menstrual problem, vaginal bleeding, vaginal discharge and vaginal pain.   Musculoskeletal: Negative.  Negative for back pain and neck pain.   Skin: Negative.  Negative for rash.   Allergic/Immunologic: Negative.    Neurological: Negative.  Negative for dizziness, syncope, facial asymmetry, speech difficulty, weakness, light-headedness and headaches.   Hematological: Negative.    Psychiatric/Behavioral: Negative.  Negative for agitation, dysphoric mood, hallucinations, self-injury, sleep disturbance and suicidal ideas.   All other systems reviewed and are negative.      Physical Exam     Initial Vitals [04/20/19 1947]   BP Pulse Resp Temp SpO2   (!) 145/83 85 20 98.1 °F (36.7 °C) 99 %      MAP       --         Physical Exam    Nursing note and vitals reviewed.  Constitutional: She appears well-developed and well-nourished. She is not diaphoretic.  Non-toxic appearance. She does not appear ill. No distress.   HENT:   Head: Normocephalic and atraumatic.   Eyes: EOM are normal. Pupils are equal, round, and reactive to light. Right eye exhibits discharge. Left eye exhibits discharge. Right conjunctiva is injected. Left conjunctiva is injected.       Neck: Normal range of motion.   Cardiovascular: Normal rate, regular rhythm, normal heart sounds and intact distal pulses. Exam reveals no gallop and no friction rub.    No murmur heard.  Pulmonary/Chest: Breath sounds normal. No respiratory distress. She has no wheezes. She has no rhonchi. She has no rales. She exhibits no tenderness.   Abdominal: Soft.   Musculoskeletal: Normal range of motion.   Neurological: She is alert and oriented to person, place, and time. No cranial nerve deficit or sensory deficit.   Skin: Skin is warm and dry. Capillary refill takes less than 2 seconds. No rash noted.   Psychiatric: She has a normal mood and affect. Her behavior is normal. Judgment and thought content normal.          ED Course   Procedures  Labs Reviewed   POCT URINE PREGNANCY          Imaging Results    None          Medical Decision Making:   Initial Assessment:   Bilateral conjunctivitis  Differential Diagnosis:   Blepharitis, iritis  Clinical Tests:   Lab Tests: Ordered and Reviewed  ED Management:  Visual acuity intact.  Patient will be discharged home on erythromycin eye ointment with instructions to follow up with [facility] optometry tomorrow and return to the ER as needed if symptoms worsen or fail to improve.  Patient verbalized understanding of discharge instructions and treatment plan.            Scribe Attestation:   Scribe #1: I performed the above scribed service and the documentation accurately describes the services I performed. I attest to the accuracy of the note.               Clinical Impression:     1. Conjunctivitis of both eyes, unspecified conjunctivitis type                                   Toussaint Battley III, FNP  04/20/19 6221

## 2019-07-15 ENCOUNTER — HOSPITAL ENCOUNTER (EMERGENCY)
Facility: HOSPITAL | Age: 29
Discharge: HOME | End: 2019-07-15
Attending: EMERGENCY MEDICINE
Payer: MEDICAID

## 2019-07-15 VITALS
WEIGHT: 230 LBS | SYSTOLIC BLOOD PRESSURE: 180 MMHG | BODY MASS INDEX: 40.75 KG/M2 | OXYGEN SATURATION: 100 % | DIASTOLIC BLOOD PRESSURE: 100 MMHG | HEART RATE: 83 BPM | TEMPERATURE: 98 F | RESPIRATION RATE: 18 BRPM | HEIGHT: 63 IN
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
## 2019-07-15 DIAGNOSIS — M62.830 BACK SPASM: ICD-10-CM

## 2019-07-15 LAB
B-HCG UR QL: NEGATIVE
BILIRUBIN, POC UA: NEGATIVE
BLOOD, POC UA: NEGATIVE
CLARITY, POC UA: CLEAR
COLOR, POC UA: YELLOW
CTP QC/QA: YES
GLUCOSE, POC UA: NEGATIVE
KETONES, POC UA: NEGATIVE
LEUKOCYTE EST, POC UA: NEGATIVE
NITRITE, POC UA: NEGATIVE
PH UR STRIP: 5.5 [PH]
PROTEIN, POC UA: NEGATIVE
SPECIFIC GRAVITY, POC UA: 1.01
UROBILINOGEN, POC UA: 0.2 E.U./DL

## 2019-07-15 PROCEDURE — 99284 EMERGENCY DEPT VISIT MOD MDM: CPT | Mod: 25,ER

## 2019-07-15 PROCEDURE — 81003 URINALYSIS AUTO W/O SCOPE: CPT | Mod: ER

## 2019-07-15 PROCEDURE — 81025 URINE PREGNANCY TEST: CPT | Mod: ER | Performed by: EMERGENCY MEDICINE

## 2019-07-15 RX ORDER — METHOCARBAMOL 750 MG/1
750 TABLET, FILM COATED ORAL EVERY 8 HOURS PRN
Qty: 20 TABLET | Refills: 0 | Status: SHIPPED | OUTPATIENT
Start: 2019-07-15 | End: 2021-10-14

## 2019-07-15 RX ORDER — IBUPROFEN 800 MG/1
800 TABLET ORAL EVERY 6 HOURS PRN
Qty: 20 TABLET | Refills: 0 | OUTPATIENT
Start: 2019-07-15 | End: 2021-10-14
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
## 2019-08-30 ENCOUNTER — HOSPITAL ENCOUNTER (EMERGENCY)
Facility: HOSPITAL | Age: 29
Discharge: HOME | End: 2019-08-30
Attending: EMERGENCY MEDICINE
Payer: MEDICAID

## 2019-08-30 VITALS
DIASTOLIC BLOOD PRESSURE: 94 MMHG | SYSTOLIC BLOOD PRESSURE: 146 MMHG | TEMPERATURE: 99 F | BODY MASS INDEX: 40.75 KG/M2 | HEIGHT: 63 IN | RESPIRATION RATE: 18 BRPM | OXYGEN SATURATION: 98 % | HEART RATE: 100 BPM | WEIGHT: 230 LBS

## 2019-08-30 DIAGNOSIS — J30.2 SEASONAL ALLERGIES: Primary | ICD-10-CM

## 2019-08-30 DIAGNOSIS — Z87.898 HISTORY OF EPISTAXIS: ICD-10-CM

## 2019-08-30 LAB
B-HCG UR QL: NEGATIVE
CTP QC/QA: YES

## 2019-08-30 PROCEDURE — 81025 URINE PREGNANCY TEST: CPT | Mod: ER | Performed by: EMERGENCY MEDICINE

## 2019-08-30 PROCEDURE — 99283 EMERGENCY DEPT VISIT LOW MDM: CPT | Mod: 25,ER

## 2019-08-30 PROCEDURE — 63600175 PHARM REV CODE 636 W HCPCS: Mod: ER | Performed by: EMERGENCY MEDICINE

## 2019-08-30 RX ORDER — FLUTICASONE PROPIONATE 50 MCG
2 SPRAY, SUSPENSION (ML) NASAL DAILY
Qty: 15 G | Refills: 0 | Status: SHIPPED | OUTPATIENT
Start: 2019-08-30 | End: 2021-12-09

## 2019-08-30 RX ORDER — PREDNISONE 20 MG/1
60 TABLET ORAL
Status: COMPLETED | OUTPATIENT
Start: 2019-08-30 | End: 2019-08-30

## 2019-08-30 RX ADMIN — PREDNISONE 60 MG: 20 TABLET ORAL at 12:08

## 2019-08-30 NOTE — ED PROVIDER NOTES
Encounter Date: 8/30/2019    SCRIBE #1 NOTE: I, Marina Marina , am scribing for, and in the presence of,  Dr. Epperson . I have scribed the following portions of the note - Other sections scribed: HPI, ROS, PE .       History     Chief Complaint   Patient presents with    Epistaxis     pt c/o frontal headache since yest, today she said she sneezed a few times and started having nose bleed which has stopped     CC: HA; Nosebleed  29 y.o female presents to the ED with a frontal HA and scratchy throat since yesterday and intermittent sneezing bright red blood today. She reports sneezing 5 to 6 times before going to sleep. She states the sneezing blood started again after waking up. She denies using any nasal sprays recently. She reports the bleeding stopped since arriving at the ED. She notes a hx of allergies. She also notes bilateral eye watering. She took benadryl, allegra, and Advil. She denies N/V, abdominal pain, fever, congestion, sinus pain or pressure, change in vision, or neck pain. This is not the worst HA of her life.     The history is provided by the patient. No  was used.     Review of patient's allergies indicates:  No Known Allergies  Past Medical History:   Diagnosis Date    Genital herpes      Past Surgical History:   Procedure Laterality Date    CERVIX SURGERY       Family History   Problem Relation Age of Onset    Hyperlipidemia Mother     Diabetes Mother      Social History     Tobacco Use    Smoking status: Never Smoker    Smokeless tobacco: Never Used   Substance Use Topics    Alcohol use: No    Drug use: No     Review of Systems   Constitutional: Negative for fever.   HENT: Positive for nosebleeds (resolved at ED), sneezing and sore throat. Negative for congestion, sinus pressure and sinus pain.    Eyes: Positive for discharge (watery). Negative for visual disturbance.   Respiratory: Positive for cough.    Gastrointestinal: Negative for abdominal pain, diarrhea,  nausea and vomiting.   Musculoskeletal: Negative for neck pain.   Neurological: Positive for headaches (frontal ).   All other systems reviewed and are negative.      Physical Exam     Initial Vitals [08/30/19 1104]   BP Pulse Resp Temp SpO2   (!) 146/94 100 18 98.7 °F (37.1 °C) 98 %      MAP       --         Physical Exam    Nursing note and vitals reviewed.  Constitutional: She appears well-developed and well-nourished. She is not diaphoretic. No distress.   HENT:   Head: Normocephalic and atraumatic.   Nose: Mucosal edema present. No nose lacerations. No epistaxis.   Mouth/Throat: Uvula is midline, oropharynx is clear and moist and mucous membranes are normal. No trismus in the jaw. No uvula swelling. No oropharyngeal exudate, posterior oropharyngeal edema, posterior oropharyngeal erythema or tonsillar abscesses.       Eyes: Conjunctivae, EOM and lids are normal. Pupils are equal, round, and reactive to light.   Neck: Normal range of motion.   Cardiovascular: Normal rate, regular rhythm and normal heart sounds. Exam reveals no gallop and no friction rub.    No murmur heard.  Pulmonary/Chest: Breath sounds normal. No respiratory distress. She has no wheezes. She has no rhonchi. She has no rales.   Abdominal: Soft. There is no tenderness.   Musculoskeletal: Normal range of motion.   Neurological: She is alert and oriented to person, place, and time. No cranial nerve deficit or sensory deficit.   Skin: Skin is warm and dry. Capillary refill takes less than 2 seconds.   Psychiatric: She has a normal mood and affect. Her behavior is normal.         ED Course   Procedures  Labs Reviewed   POCT URINE PREGNANCY          Imaging Results    None          Medical Decision Making:   Initial Assessment:   29 y.o female presents to the ED with a frontal HA and intermittent nosebleeds. Physical exam findings are significant for post nasal drip and mucosal edema. No active bleeding noted. Heart and lungs are clear. No  oropharyngeal erythema, edema, or exudate. Neurovascularly intact. Sensation intact. PERRL and EOM normal.   Clinical Tests:   Lab Tests: Ordered and Reviewed  ED Management:  I will order prednisone and UPT.  Patient will be treated with Flonase.  I do not detect bleeding on exam.  She has only mild mucosal edema            Scribe Attestation:   Scribe #1: I performed the above scribed service and the documentation accurately describes the services I performed. I attest to the accuracy of the note.           I, Dr. Autumn Epperson, personally performed the services described in this documentation. All medical record entries made by the scribe were at my direction and in my presence.  I have reviewed the chart and agree that the record reflects my personal performance and is accurate and complete. Autumn Epperson MD.  1:02 PM 08/31/2019          Clinical Impression:     1. Seasonal allergies    2. History of epistaxis                                   Autumn Epperson MD  08/31/19 3039

## 2021-10-14 ENCOUNTER — HOSPITAL ENCOUNTER (EMERGENCY)
Facility: HOSPITAL | Age: 31
Discharge: HOME | End: 2021-10-14
Attending: EMERGENCY MEDICINE
Payer: MEDICAID

## 2021-10-14 VITALS
WEIGHT: 218 LBS | SYSTOLIC BLOOD PRESSURE: 144 MMHG | HEART RATE: 80 BPM | OXYGEN SATURATION: 99 % | DIASTOLIC BLOOD PRESSURE: 89 MMHG | TEMPERATURE: 99 F | HEIGHT: 63 IN | BODY MASS INDEX: 38.62 KG/M2 | RESPIRATION RATE: 19 BRPM

## 2021-10-14 DIAGNOSIS — S16.1XXA STRAIN OF NECK MUSCLE, INITIAL ENCOUNTER: ICD-10-CM

## 2021-10-14 DIAGNOSIS — R07.89 CHEST WALL PAIN: ICD-10-CM
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
## 2021-10-14 LAB
B-HCG UR QL: NEGATIVE
BILIRUBIN, POC UA: NEGATIVE
BLOOD, POC UA: ABNORMAL
CLARITY, POC UA: CLEAR
COLOR, POC UA: YELLOW
CTP QC/QA: YES
GLUCOSE, POC UA: NEGATIVE
KETONES, POC UA: NEGATIVE
LEUKOCYTE EST, POC UA: NEGATIVE
NITRITE, POC UA: NEGATIVE
PH UR STRIP: 6.5 [PH]
PROTEIN, POC UA: NEGATIVE
SPECIFIC GRAVITY, POC UA: 1.02
UROBILINOGEN, POC UA: 0.2 E.U./DL

## 2021-10-14 PROCEDURE — 93010 EKG 12-LEAD: ICD-10-PCS | Mod: ,,, | Performed by: INTERNAL MEDICINE

## 2021-10-14 PROCEDURE — 81003 URINALYSIS AUTO W/O SCOPE: CPT | Mod: ER

## 2021-10-14 PROCEDURE — 81025 URINE PREGNANCY TEST: CPT | Mod: ER | Performed by: EMERGENCY MEDICINE

## 2021-10-14 PROCEDURE — 99285 EMERGENCY DEPT VISIT HI MDM: CPT | Mod: 25,ER

## 2021-10-14 PROCEDURE — 25000003 PHARM REV CODE 250: Mod: ER | Performed by: EMERGENCY MEDICINE

## 2021-10-14 PROCEDURE — 93010 ELECTROCARDIOGRAM REPORT: CPT | Mod: ,,, | Performed by: INTERNAL MEDICINE

## 2021-10-14 PROCEDURE — 93005 ELECTROCARDIOGRAM TRACING: CPT | Mod: ER

## 2021-10-14 RX ORDER — CYCLOBENZAPRINE HCL 10 MG
10 TABLET ORAL 3 TIMES DAILY PRN
Qty: 15 TABLET | Refills: 0 | Status: SHIPPED | OUTPATIENT
Start: 2021-10-14 | End: 2021-10-19

## 2021-10-14 RX ORDER — DICLOFENAC SODIUM 10 MG/G
2 GEL TOPICAL 4 TIMES DAILY PRN
Qty: 1 TUBE | Refills: 0 | Status: SHIPPED | OUTPATIENT
Start: 2021-10-14 | End: 2021-12-09

## 2021-10-14 RX ORDER — IBUPROFEN 600 MG/1
600 TABLET ORAL EVERY 6 HOURS PRN
Qty: 20 TABLET | Refills: 0 | Status: SHIPPED | OUTPATIENT
Start: 2021-10-14 | End: 2021-12-09

## 2021-10-14 RX ORDER — KETOROLAC TROMETHAMINE 10 MG/1
10 TABLET, FILM COATED ORAL
Status: COMPLETED | OUTPATIENT
Start: 2021-10-14 | End: 2021-10-14

## 2021-10-14 RX ORDER — ACETAMINOPHEN 500 MG
1000 TABLET ORAL EVERY 6 HOURS PRN
Qty: 30 TABLET | Refills: 0 | Status: SHIPPED | OUTPATIENT
Start: 2021-10-14 | End: 2021-12-09

## 2021-10-14 RX ADMIN — KETOROLAC TROMETHAMINE 10 MG: 10 TABLET, FILM COATED ORAL at 09:10

## 2021-11-23 ENCOUNTER — IMMUNIZATION (OUTPATIENT)
Dept: PHARMACY | Facility: CLINIC | Age: 31
End: 2021-11-23
Payer: MEDICAID

## 2021-12-08 ENCOUNTER — TELEPHONE (OUTPATIENT)
Dept: INTERNAL MEDICINE | Facility: CLINIC | Age: 31
End: 2021-12-08

## 2021-12-09 ENCOUNTER — LAB VISIT (OUTPATIENT)
Dept: LAB | Facility: OTHER | Age: 31
End: 2021-12-09
Attending: STUDENT IN AN ORGANIZED HEALTH CARE EDUCATION/TRAINING PROGRAM
Payer: COMMERCIAL

## 2021-12-09 ENCOUNTER — OFFICE VISIT (OUTPATIENT)
Dept: INTERNAL MEDICINE | Facility: CLINIC | Age: 31
End: 2021-12-09
Payer: COMMERCIAL

## 2021-12-09 VITALS
HEART RATE: 90 BPM | DIASTOLIC BLOOD PRESSURE: 76 MMHG | SYSTOLIC BLOOD PRESSURE: 122 MMHG | TEMPERATURE: 98 F | OXYGEN SATURATION: 99 %

## 2021-12-09 DIAGNOSIS — J06.9 VIRAL UPPER RESPIRATORY ILLNESS: ICD-10-CM

## 2021-12-09 DIAGNOSIS — J06.9 VIRAL UPPER RESPIRATORY ILLNESS: Primary | ICD-10-CM

## 2021-12-09 LAB — HETEROPH AB SERPL QL IA: NEGATIVE

## 2021-12-09 PROCEDURE — 36415 COLL VENOUS BLD VENIPUNCTURE: CPT | Performed by: STUDENT IN AN ORGANIZED HEALTH CARE EDUCATION/TRAINING PROGRAM

## 2021-12-09 PROCEDURE — 99203 OFFICE O/P NEW LOW 30 MIN: CPT | Mod: S$PBB,,, | Performed by: STUDENT IN AN ORGANIZED HEALTH CARE EDUCATION/TRAINING PROGRAM

## 2021-12-09 PROCEDURE — 99999 PR PBB SHADOW E&M-EST. PATIENT-LVL II: ICD-10-PCS | Mod: PBBFAC,,, | Performed by: STUDENT IN AN ORGANIZED HEALTH CARE EDUCATION/TRAINING PROGRAM

## 2021-12-09 PROCEDURE — 99212 OFFICE O/P EST SF 10 MIN: CPT | Mod: PBBFAC | Performed by: STUDENT IN AN ORGANIZED HEALTH CARE EDUCATION/TRAINING PROGRAM

## 2021-12-09 PROCEDURE — 86308 HETEROPHILE ANTIBODY SCREEN: CPT | Performed by: STUDENT IN AN ORGANIZED HEALTH CARE EDUCATION/TRAINING PROGRAM

## 2021-12-09 PROCEDURE — 99999 PR PBB SHADOW E&M-EST. PATIENT-LVL II: CPT | Mod: PBBFAC,,, | Performed by: STUDENT IN AN ORGANIZED HEALTH CARE EDUCATION/TRAINING PROGRAM

## 2021-12-09 PROCEDURE — 99203 PR OFFICE/OUTPT VISIT, NEW, LEVL III, 30-44 MIN: ICD-10-PCS | Mod: S$PBB,,, | Performed by: STUDENT IN AN ORGANIZED HEALTH CARE EDUCATION/TRAINING PROGRAM

## 2021-12-09 RX ORDER — AZELASTINE 1 MG/ML
2 SPRAY, METERED NASAL 2 TIMES DAILY
Qty: 30 ML | Refills: 1 | OUTPATIENT
Start: 2021-12-09 | End: 2022-01-03

## 2021-12-09 RX ORDER — PREDNISONE 20 MG/1
20 TABLET ORAL DAILY
Qty: 5 TABLET | Refills: 0 | Status: SHIPPED | OUTPATIENT
Start: 2021-12-09 | End: 2021-12-14

## 2021-12-10 ENCOUNTER — PATIENT MESSAGE (OUTPATIENT)
Dept: INTERNAL MEDICINE | Facility: CLINIC | Age: 31
End: 2021-12-10
Payer: MEDICAID

## 2022-01-03 ENCOUNTER — HOSPITAL ENCOUNTER (EMERGENCY)
Facility: HOSPITAL | Age: 32
Discharge: HOME | End: 2022-01-03
Attending: EMERGENCY MEDICINE
Payer: MEDICAID

## 2022-01-03 VITALS
BODY MASS INDEX: 39.68 KG/M2 | DIASTOLIC BLOOD PRESSURE: 78 MMHG | OXYGEN SATURATION: 98 % | RESPIRATION RATE: 18 BRPM | WEIGHT: 224 LBS | HEART RATE: 95 BPM | TEMPERATURE: 98 F | SYSTOLIC BLOOD PRESSURE: 129 MMHG

## 2022-01-03 DIAGNOSIS — U07.1 COVID-19 VIRUS INFECTION: Primary | ICD-10-CM

## 2022-01-03 DIAGNOSIS — J30.9 ALLERGIC RHINITIS, UNSPECIFIED SEASONALITY, UNSPECIFIED TRIGGER: ICD-10-CM

## 2022-01-03 LAB
B-HCG UR QL: NEGATIVE
CTP QC/QA: YES
CTP QC/QA: YES
SARS-COV-2 RDRP RESP QL NAA+PROBE: POSITIVE

## 2022-01-03 PROCEDURE — 99284 EMERGENCY DEPT VISIT MOD MDM: CPT | Mod: 25,ER

## 2022-01-03 PROCEDURE — U0002 COVID-19 LAB TEST NON-CDC: HCPCS | Mod: ER | Performed by: NURSE PRACTITIONER

## 2022-01-03 PROCEDURE — 81025 URINE PREGNANCY TEST: CPT | Mod: ER | Performed by: EMERGENCY MEDICINE

## 2022-01-03 RX ORDER — AZELASTINE 1 MG/ML
1 SPRAY, METERED NASAL 2 TIMES DAILY
Qty: 30 ML | Refills: 0 | Status: SHIPPED | OUTPATIENT
Start: 2022-01-03 | End: 2023-03-22

## 2022-01-03 RX ORDER — DEXTROMETHORPHAN HYDROBROMIDE, GUAIFENESIN 5; 100 MG/5ML; MG/5ML
650 LIQUID ORAL EVERY 8 HOURS
Qty: 20 TABLET | Refills: 0 | Status: SHIPPED | OUTPATIENT
Start: 2022-01-03 | End: 2022-02-10

## 2022-01-03 RX ORDER — IBUPROFEN 600 MG/1
600 TABLET ORAL EVERY 6 HOURS PRN
Qty: 20 TABLET | Refills: 0 | Status: SHIPPED | OUTPATIENT
Start: 2022-01-03 | End: 2022-02-10

## 2022-01-03 NOTE — ED PROVIDER NOTES
Encounter Date: 1/3/2022       History     Chief Complaint   Patient presents with    Nasal Congestion     PND, CONGESTION, RUNNY NOSE AND HEADACHE X 2 DAYS; DENIES FEVER     30 y/o female presents to the ED with complaints of post nasal drip, congestion, and HA for 2 days.  Possible COVID exposure.  Has had the vaccine with no booster.  Patient denies rash, fever, chest pain, SOB, numbness, weakness, tingling, abdominal pain, back pain, dysuria, hematuria, nausea, vomiting, diarrhea, or any other complaints.  Patient rates the pain as 8/10 and has not taken any medications for the symptoms.  No Alleviating/aggravating factors.                Review of patient's allergies indicates:  No Known Allergies  Past Medical History:   Diagnosis Date    Genital herpes      Past Surgical History:   Procedure Laterality Date    CERVIX SURGERY      biopsy, negative malignancy     Family History   Problem Relation Age of Onset    Hyperlipidemia Mother     Diabetes Mother      Social History     Tobacco Use    Smoking status: Never Smoker    Smokeless tobacco: Never Used   Substance Use Topics    Alcohol use: No    Drug use: No     Review of Systems   Constitutional: Negative for chills and fever.   HENT: Positive for congestion and postnasal drip. Negative for ear pain, rhinorrhea, sore throat and trouble swallowing.    Eyes: Negative for pain, discharge and redness.   Respiratory: Negative for cough and shortness of breath.    Cardiovascular: Negative for chest pain.   Gastrointestinal: Negative for abdominal pain, diarrhea, nausea and vomiting.   Genitourinary: Negative for decreased urine volume and dysuria.   Musculoskeletal: Negative for back pain, neck pain and neck stiffness.   Skin: Negative for rash.   Neurological: Positive for headaches. Negative for dizziness, weakness, light-headedness and numbness.   Psychiatric/Behavioral: Negative for confusion.       Physical Exam     Initial Vitals [01/03/22 1324]   BP  Pulse Resp Temp SpO2   129/78 95 18 98 °F (36.7 °C) 98 %      MAP       --         Physical Exam    Nursing note and vitals reviewed.  Constitutional: Vital signs are normal. She appears well-developed.  Non-toxic appearance. She does not appear ill.   HENT:   Head: Normocephalic and atraumatic.   Right Ear: Tympanic membrane and external ear normal.   Left Ear: Tympanic membrane and external ear normal.   Nose: Mucosal edema present.   Mouth/Throat: Uvula is midline, oropharynx is clear and moist and mucous membranes are normal.   Eyes: Conjunctivae are normal.   Neck:   Normal range of motion.  Cardiovascular: Normal rate and regular rhythm.   Pulmonary/Chest: Effort normal and breath sounds normal. She exhibits no tenderness.   Abdominal: Abdomen is soft. There is no abdominal tenderness.   Musculoskeletal:      Cervical back: Normal range of motion.     Neurological: She is alert and oriented to person, place, and time. Gait normal. GCS eye subscore is 4. GCS verbal subscore is 5. GCS motor subscore is 6.   Skin: Skin is warm, dry and intact. No rash noted.   Psychiatric: She has a normal mood and affect. Her speech is normal and behavior is normal. Judgment and thought content normal.         ED Course   Procedures  Labs Reviewed   SARS-COV-2 RDRP GENE - Abnormal; Notable for the following components:       Result Value    POC Rapid COVID Positive (*)     All other components within normal limits    Narrative:     This test utilizes isothermal nucleic acid amplification   technology to detect the SARS-CoV-2 RdRp nucleic acid segment.   The analytical sensitivity (limit of detection) is 125 genome   equivalents/mL.   A POSITIVE result implies infection with the SARS-CoV-2 virus;   the patient is presumed to be contagious.     A NEGATIVE result means that SARS-CoV-2 nucleic acids are not   present above the limit of detection. A NEGATIVE result should be   treated as presumptive. It does not rule out the  possibility of   COVID-19 and should not be the sole basis for treatment decisions.   If COVID-19 is strongly suspected based on clinical and exposure   history, re-testing using an alternate molecular assay should be   considered.   This test is only for use under the Food and Drug   Administration s Emergency Use Authorization (EUA).   Commercial kits are provided by [employer].   Performance characteristics of the EUA have been independently   verified by [facility] Department of   Pathology and Laboratory Medicine.   _________________________________________________________________   The authorized Fact Sheet for Healthcare Providers and the authorized Fact   Sheet for Patients of the ID NOW COVID-19 are available on the FDA   website:     https://www.fda.gov/media/915680/download  https://www.fda.gov/media/252217/download       POCT URINE PREGNANCY          Imaging Results    None          Medications - No data to display        APC / Resident Notes:   This is an evaluation of a 31 y.o. female that presents to the Emergency Department for COVID symptoms with possible exposure. The patient is a non-toxic, afebrile, and well appearing female. On physical exam: No meningeal signs or cervical lymphadenopathy. Breath sounds equal bilaterally and without adventitious breath sounds, tachypnea or respiratory distress. Room air pulse ox of 98%. No hypoxia or dyspnea on exertion and speaking in full sentences with no pauses. Vaccinated for COVID: Yes. Vital Signs Are Reassuring.     RESULTS: COVID-19: Positive    COVID-19 Antibody Treatment FDA EUA Documentation-COVID Risk Score 1  The patient does not meet current system Monoclonal Antibody criteria.     My overall impression is COVID-19 Positive, allergic rhinitis. I considered, but at this time, do not suspect OM, OE, strep pharyngitis, influenza, meningitis, pneumonia, bacterial sinusitis, or significant dehydration requiring IV fluids or  admission. The patient is maintaining oxygen saturations > 95% on room air and does not require supplemental oxygen.     The patient will be discharged home with supportive care and quarantine recommendations. Return precautions for fevers, SOB, or other worsening symptoms discussed. The diagnosis, treatment plan, instructions for follow-up and reevaluation with Primary Care as well as ED return precautions were discussed and understanding was verbalized. All questions or concerns have been addressed.                    Clinical Impression:   Final diagnoses:  [U07.1] COVID-19 virus infection (Primary)  [J30.9] Allergic rhinitis, unspecified seasonality, unspecified trigger          ED Disposition Condition    Discharge Stable        ED Prescriptions     Medication Sig Dispense Start Date End Date Auth. Provider    azelastine (ASTELIN) 137 mcg (0.1 %) nasal spray 1 spray (137 mcg total) by Nasal route 2 (two) times daily. 30 mL 1/3/2022 1/3/2023 ELIAZAR Koenig    acetaminophen (TYLENOL) 650 MG TbSR Take 1 tablet (650 mg total) by mouth every 8 (eight) hours. 20 tablet 1/3/2022  ELIAZAR Koenig    ibuprofen (ADVIL,MOTRIN) 600 MG tablet Take 1 tablet (600 mg total) by mouth every 6 (six) hours as needed for Pain. 20 tablet 1/3/2022  ELIAZAR Koenig        Follow-up Information     Follow up With Specialties Details Why Contact Info     [facility]  Schedule an appointment as soon as possible for a visit in 2 days  [address] [place] [address]  [phone]      [facility] ED Emergency Medicine Go to  If symptoms worsen [address] [facility] [address] [phone]           ELIAZAR Koenig  01/03/22 3749

## 2022-01-03 NOTE — Clinical Note
"Kim"Marie Flynn was seen and treated in our emergency department on 1/3/2022.     COVID-19 is present in our communities across the state. There is limited testing for COVID at this time, so not all patients can be tested. In this situation, your employee meets the following criteria:    Kim Flynn has met the criteria for COVID-19 testing and has a POSITIVE result. She can return to work once they are asymptomatic for 72 hours without the use of fever reducing medications AND at least ten days from the first positive result.     If you have any questions or concerns, or if I can be of further assistance, please do not hesitate to contact me.    Sincerely,             ELIAZAR Koenig"

## 2022-01-27 ENCOUNTER — PES CALL (OUTPATIENT)
Dept: ADMINISTRATIVE | Facility: CLINIC | Age: 32
End: 2022-01-27
Payer: MEDICAID

## 2022-02-10 ENCOUNTER — OFFICE VISIT (OUTPATIENT)
Dept: INTERNAL MEDICINE | Facility: CLINIC | Age: 32
End: 2022-02-10
Payer: MEDICAID

## 2022-02-10 ENCOUNTER — LAB VISIT (OUTPATIENT)
Dept: LAB | Facility: OTHER | Age: 32
End: 2022-02-10
Attending: STUDENT IN AN ORGANIZED HEALTH CARE EDUCATION/TRAINING PROGRAM
Payer: MEDICAID

## 2022-02-10 VITALS
HEART RATE: 77 BPM | SYSTOLIC BLOOD PRESSURE: 120 MMHG | BODY MASS INDEX: 39.46 KG/M2 | HEIGHT: 63 IN | DIASTOLIC BLOOD PRESSURE: 76 MMHG | OXYGEN SATURATION: 98 % | WEIGHT: 222.69 LBS

## 2022-02-10 DIAGNOSIS — Z20.2 SEXUALLY TRANSMITTED DISEASE EXPOSURE: ICD-10-CM

## 2022-02-10 DIAGNOSIS — Z11.4 SCREENING FOR HIV WITHOUT PRESENCE OF RISK FACTORS: ICD-10-CM

## 2022-02-10 DIAGNOSIS — Z11.59 ENCOUNTER FOR HEPATITIS C SCREENING TEST FOR LOW RISK PATIENT: ICD-10-CM

## 2022-02-10 DIAGNOSIS — Z00.00 HEALTH MAINTENANCE EXAMINATION: ICD-10-CM

## 2022-02-10 DIAGNOSIS — Z13.6 SCREENING FOR CARDIOVASCULAR CONDITION: ICD-10-CM

## 2022-02-10 DIAGNOSIS — Z83.2 FAMILY HISTORY OF SICKLE CELL DISEASE: ICD-10-CM

## 2022-02-10 DIAGNOSIS — Z71.3 ENCOUNTER FOR WEIGHT LOSS COUNSELING: ICD-10-CM

## 2022-02-10 DIAGNOSIS — Z71.3 ENCOUNTER FOR WEIGHT LOSS COUNSELING: Primary | ICD-10-CM

## 2022-02-10 DIAGNOSIS — Z13.1 SCREENING FOR DIABETES MELLITUS: ICD-10-CM

## 2022-02-10 LAB
ALBUMIN SERPL BCP-MCNC: 4.1 G/DL (ref 3.5–5.2)
ALP SERPL-CCNC: 78 U/L (ref 55–135)
ALT SERPL W/O P-5'-P-CCNC: 19 U/L (ref 10–44)
ANION GAP SERPL CALC-SCNC: 11 MMOL/L (ref 8–16)
AST SERPL-CCNC: 16 U/L (ref 10–40)
BASOPHILS # BLD AUTO: 0.05 K/UL (ref 0–0.2)
BASOPHILS NFR BLD: 1.1 % (ref 0–1.9)
BILIRUB SERPL-MCNC: 1.3 MG/DL (ref 0.1–1)
BUN SERPL-MCNC: 8 MG/DL (ref 6–20)
CALCIUM SERPL-MCNC: 9.4 MG/DL (ref 8.7–10.5)
CHLORIDE SERPL-SCNC: 105 MMOL/L (ref 95–110)
CHOLEST SERPL-MCNC: 144 MG/DL (ref 120–199)
CHOLEST/HDLC SERPL: 3.5 {RATIO} (ref 2–5)
CO2 SERPL-SCNC: 26 MMOL/L (ref 23–29)
CREAT SERPL-MCNC: 0.8 MG/DL (ref 0.5–1.4)
DIFFERENTIAL METHOD: ABNORMAL
EOSINOPHIL # BLD AUTO: 0.1 K/UL (ref 0–0.5)
EOSINOPHIL NFR BLD: 1.8 % (ref 0–8)
ERYTHROCYTE [DISTWIDTH] IN BLOOD BY AUTOMATED COUNT: 13.4 % (ref 11.5–14.5)
EST. GFR  (AFRICAN AMERICAN): >60 ML/MIN/1.73 M^2
EST. GFR  (NON AFRICAN AMERICAN): >60 ML/MIN/1.73 M^2
ESTIMATED AVG GLUCOSE: 111 MG/DL (ref 68–131)
GLUCOSE SERPL-MCNC: 101 MG/DL (ref 70–110)
HBA1C MFR BLD: 5.5 % (ref 4–5.6)
HCT VFR BLD AUTO: 39.4 % (ref 37–48.5)
HDLC SERPL-MCNC: 41 MG/DL (ref 40–75)
HDLC SERPL: 28.5 % (ref 20–50)
HGB BLD-MCNC: 13.2 G/DL (ref 12–16)
HGB S BLD QL SOLY: POSITIVE
IMM GRANULOCYTES # BLD AUTO: 0 K/UL (ref 0–0.04)
IMM GRANULOCYTES NFR BLD AUTO: 0 % (ref 0–0.5)
LDLC SERPL CALC-MCNC: 89 MG/DL (ref 63–159)
LYMPHOCYTES # BLD AUTO: 1.7 K/UL (ref 1–4.8)
LYMPHOCYTES NFR BLD: 39.3 % (ref 18–48)
MCH RBC QN AUTO: 28.3 PG (ref 27–31)
MCHC RBC AUTO-ENTMCNC: 33.5 G/DL (ref 32–36)
MCV RBC AUTO: 84 FL (ref 82–98)
MONOCYTES # BLD AUTO: 0.2 K/UL (ref 0.3–1)
MONOCYTES NFR BLD: 4.3 % (ref 4–15)
NEUTROPHILS # BLD AUTO: 2.4 K/UL (ref 1.8–7.7)
NEUTROPHILS NFR BLD: 53.5 % (ref 38–73)
NONHDLC SERPL-MCNC: 103 MG/DL
NRBC BLD-RTO: 0 /100 WBC
PLATELET # BLD AUTO: 287 K/UL (ref 150–450)
PMV BLD AUTO: 10 FL (ref 9.2–12.9)
POTASSIUM SERPL-SCNC: 3.6 MMOL/L (ref 3.5–5.1)
PROT SERPL-MCNC: 7.3 G/DL (ref 6–8.4)
RBC # BLD AUTO: 4.67 M/UL (ref 4–5.4)
SODIUM SERPL-SCNC: 142 MMOL/L (ref 136–145)
TRIGL SERPL-MCNC: 70 MG/DL (ref 30–150)
TSH SERPL DL<=0.005 MIU/L-ACNC: 0.88 UIU/ML (ref 0.4–4)
WBC # BLD AUTO: 4.4 K/UL (ref 3.9–12.7)

## 2022-02-10 PROCEDURE — 80061 LIPID PANEL: CPT | Performed by: STUDENT IN AN ORGANIZED HEALTH CARE EDUCATION/TRAINING PROGRAM

## 2022-02-10 PROCEDURE — 99395 PREV VISIT EST AGE 18-39: CPT | Mod: S$PBB,,, | Performed by: STUDENT IN AN ORGANIZED HEALTH CARE EDUCATION/TRAINING PROGRAM

## 2022-02-10 PROCEDURE — 3044F HG A1C LEVEL LT 7.0%: CPT | Mod: CPTII,,, | Performed by: STUDENT IN AN ORGANIZED HEALTH CARE EDUCATION/TRAINING PROGRAM

## 2022-02-10 PROCEDURE — 1159F MED LIST DOCD IN RCRD: CPT | Mod: CPTII,,, | Performed by: STUDENT IN AN ORGANIZED HEALTH CARE EDUCATION/TRAINING PROGRAM

## 2022-02-10 PROCEDURE — 85660 RBC SICKLE CELL TEST: CPT | Performed by: STUDENT IN AN ORGANIZED HEALTH CARE EDUCATION/TRAINING PROGRAM

## 2022-02-10 PROCEDURE — 80053 COMPREHEN METABOLIC PANEL: CPT | Performed by: STUDENT IN AN ORGANIZED HEALTH CARE EDUCATION/TRAINING PROGRAM

## 2022-02-10 PROCEDURE — 1160F PR REVIEW ALL MEDS BY PRESCRIBER/CLIN PHARMACIST DOCUMENTED: ICD-10-PCS | Mod: CPTII,,, | Performed by: STUDENT IN AN ORGANIZED HEALTH CARE EDUCATION/TRAINING PROGRAM

## 2022-02-10 PROCEDURE — 3074F SYST BP LT 130 MM HG: CPT | Mod: CPTII,,, | Performed by: STUDENT IN AN ORGANIZED HEALTH CARE EDUCATION/TRAINING PROGRAM

## 2022-02-10 PROCEDURE — 3044F PR MOST RECENT HEMOGLOBIN A1C LEVEL <7.0%: ICD-10-PCS | Mod: CPTII,,, | Performed by: STUDENT IN AN ORGANIZED HEALTH CARE EDUCATION/TRAINING PROGRAM

## 2022-02-10 PROCEDURE — 99999 PR PBB SHADOW E&M-EST. PATIENT-LVL III: ICD-10-PCS | Mod: PBBFAC,,, | Performed by: STUDENT IN AN ORGANIZED HEALTH CARE EDUCATION/TRAINING PROGRAM

## 2022-02-10 PROCEDURE — 36415 COLL VENOUS BLD VENIPUNCTURE: CPT | Performed by: STUDENT IN AN ORGANIZED HEALTH CARE EDUCATION/TRAINING PROGRAM

## 2022-02-10 PROCEDURE — 3008F PR BODY MASS INDEX (BMI) DOCUMENTED: ICD-10-PCS | Mod: CPTII,,, | Performed by: STUDENT IN AN ORGANIZED HEALTH CARE EDUCATION/TRAINING PROGRAM

## 2022-02-10 PROCEDURE — 3074F PR MOST RECENT SYSTOLIC BLOOD PRESSURE < 130 MM HG: ICD-10-PCS | Mod: CPTII,,, | Performed by: STUDENT IN AN ORGANIZED HEALTH CARE EDUCATION/TRAINING PROGRAM

## 2022-02-10 PROCEDURE — 99213 OFFICE O/P EST LOW 20 MIN: CPT | Mod: PBBFAC | Performed by: STUDENT IN AN ORGANIZED HEALTH CARE EDUCATION/TRAINING PROGRAM

## 2022-02-10 PROCEDURE — 87340 HEPATITIS B SURFACE AG IA: CPT | Performed by: STUDENT IN AN ORGANIZED HEALTH CARE EDUCATION/TRAINING PROGRAM

## 2022-02-10 PROCEDURE — 1159F PR MEDICATION LIST DOCUMENTED IN MEDICAL RECORD: ICD-10-PCS | Mod: CPTII,,, | Performed by: STUDENT IN AN ORGANIZED HEALTH CARE EDUCATION/TRAINING PROGRAM

## 2022-02-10 PROCEDURE — 86803 HEPATITIS C AB TEST: CPT | Performed by: STUDENT IN AN ORGANIZED HEALTH CARE EDUCATION/TRAINING PROGRAM

## 2022-02-10 PROCEDURE — 3078F DIAST BP <80 MM HG: CPT | Mod: CPTII,,, | Performed by: STUDENT IN AN ORGANIZED HEALTH CARE EDUCATION/TRAINING PROGRAM

## 2022-02-10 PROCEDURE — 84443 ASSAY THYROID STIM HORMONE: CPT | Performed by: STUDENT IN AN ORGANIZED HEALTH CARE EDUCATION/TRAINING PROGRAM

## 2022-02-10 PROCEDURE — 99395 PR PREVENTIVE VISIT,EST,18-39: ICD-10-PCS | Mod: S$PBB,,, | Performed by: STUDENT IN AN ORGANIZED HEALTH CARE EDUCATION/TRAINING PROGRAM

## 2022-02-10 PROCEDURE — 83036 HEMOGLOBIN GLYCOSYLATED A1C: CPT | Performed by: STUDENT IN AN ORGANIZED HEALTH CARE EDUCATION/TRAINING PROGRAM

## 2022-02-10 PROCEDURE — 86592 SYPHILIS TEST NON-TREP QUAL: CPT | Performed by: STUDENT IN AN ORGANIZED HEALTH CARE EDUCATION/TRAINING PROGRAM

## 2022-02-10 PROCEDURE — 1160F RVW MEDS BY RX/DR IN RCRD: CPT | Mod: CPTII,,, | Performed by: STUDENT IN AN ORGANIZED HEALTH CARE EDUCATION/TRAINING PROGRAM

## 2022-02-10 PROCEDURE — 87389 HIV-1 AG W/HIV-1&-2 AB AG IA: CPT | Performed by: STUDENT IN AN ORGANIZED HEALTH CARE EDUCATION/TRAINING PROGRAM

## 2022-02-10 PROCEDURE — 3008F BODY MASS INDEX DOCD: CPT | Mod: CPTII,,, | Performed by: STUDENT IN AN ORGANIZED HEALTH CARE EDUCATION/TRAINING PROGRAM

## 2022-02-10 PROCEDURE — 3078F PR MOST RECENT DIASTOLIC BLOOD PRESSURE < 80 MM HG: ICD-10-PCS | Mod: CPTII,,, | Performed by: STUDENT IN AN ORGANIZED HEALTH CARE EDUCATION/TRAINING PROGRAM

## 2022-02-10 PROCEDURE — 85025 COMPLETE CBC W/AUTO DIFF WBC: CPT | Performed by: STUDENT IN AN ORGANIZED HEALTH CARE EDUCATION/TRAINING PROGRAM

## 2022-02-10 PROCEDURE — 99999 PR PBB SHADOW E&M-EST. PATIENT-LVL III: CPT | Mod: PBBFAC,,, | Performed by: STUDENT IN AN ORGANIZED HEALTH CARE EDUCATION/TRAINING PROGRAM

## 2022-02-10 RX ORDER — LORATADINE 10 MG/1
10 TABLET ORAL DAILY
COMMUNITY

## 2022-02-10 NOTE — PROGRESS NOTES
Subjective:       Patient ID: Kim Flynn is a 31 y.o. female.    Chief Complaint: Encounter for weight loss counseling [Z71.3]    Patient is known to me, here to establish care.    Health maintenance -   Denies family history of colorectal cancer.   Denies family history of breast or ovarian cancers.  Last pap performed 2021.   History of prior abnormal pap smears, biopsy negative ~10 years ago.  Sees Dr. Rene for gynecology.  Denies family history of osteoporosis.  Denies significant family history of cardiac disease.  UTD on COVID19 primary, influenza, Tdap vaccinations.  Due for COVID booster vaccinations  Never smoker.  Drinks alcohol 1 times per month, 1-5 drinks per sitting.   Denies drug use.  Due for HIV and Hep C screening.  Due for lipid screening.  Due for diabetes screening.     Has irregular menstrual cycles once monthly.  Menstruation lasts for 6-7 days.  Began menarche at age 10.   Denies menorrhagia or requiring more than 5 pads or tampons in a single day.  1 total pregnancies. Daughter born at 34 wga, preeclampsia and diabetes.  for preeclampsia.   Not currently sexually active.   Not currently using contraception.   Agreeable to routine STI testing.     Weight loss counseling -   Eats mostly at home  Likes salads  Bakes chicken, fish  Rare soda  Water with crystal light  Makes smoothies and Stevia   Not currently exercising  Previously doing HIT  Has previously tried Adipex   Lost weight initially     Wt Readings from Last 10 Encounters:  02/10/22 : 101 kg (222 lb 10.6 oz)  22 : 101.6 kg (224 lb)  10/14/21 : 98.9 kg (218 lb)  19 : 104.3 kg (230 lb)  07/15/19 : 104.3 kg (230 lb)  19 : 103 kg (227 lb)  19 : 103 kg (227 lb)  19 : 104.3 kg (230 lb)  19 : 104.3 kg (230 lb)  18 : 103.9 kg (229 lb)    Endorses family history of sickle cell disease  History of anemia     Review of Systems   Constitutional: Positive for unexpected weight change.  "Negative for appetite change, chills, fatigue and fever.   Respiratory: Negative for cough and shortness of breath.    Cardiovascular: Negative for chest pain, palpitations and leg swelling.   Gastrointestinal: Positive for constipation. Negative for abdominal pain, diarrhea, nausea and vomiting.   Skin: Negative for rash.   Neurological: Negative for dizziness, syncope, weakness and headaches.         Current Outpatient Medications   Medication Instructions    acetaminophen (TYLENOL) 650 mg, Oral, Every 8 hours    azelastine (ASTELIN) 137 mcg, Nasal, 2 times daily    chrom real/brindal berry (GARCINIA CAMBOGIA ORAL) 1 capsule, Oral, Daily    fexofenadine (ALLEGRA) 60 mg, Oral, Daily    ibuprofen (ADVIL,MOTRIN) 600 mg, Oral, Every 6 hours PRN     Objective:      Vitals:    02/10/22 0803   BP: 120/76   Pulse: 77   SpO2: 98%   Weight: 101 kg (222 lb 10.6 oz)   Height: 5' 3" (1.6 m)   PainSc: 0-No pain     Body mass index is 39.44 kg/m².    Physical Exam  Vitals reviewed.   Constitutional:       General: She is not in acute distress.     Appearance: Normal appearance. She is not ill-appearing or diaphoretic.   HENT:      Head: Normocephalic and atraumatic.      Right Ear: Tympanic membrane, ear canal and external ear normal. There is no impacted cerumen.      Left Ear: Tympanic membrane, ear canal and external ear normal. There is no impacted cerumen.      Nose: Nose normal. No rhinorrhea.      Mouth/Throat:      Mouth: Mucous membranes are moist.      Pharynx: Oropharynx is clear. No oropharyngeal exudate or posterior oropharyngeal erythema.   Eyes:      General: No scleral icterus.        Right eye: No discharge.         Left eye: No discharge.      Conjunctiva/sclera: Conjunctivae normal.   Neck:      Thyroid: No thyromegaly or thyroid tenderness.      Trachea: Trachea normal.   Cardiovascular:      Rate and Rhythm: Normal rate and regular rhythm.      Heart sounds: Normal heart sounds. No murmur heard.  No " friction rub. No gallop.    Pulmonary:      Effort: Pulmonary effort is normal. No respiratory distress.      Breath sounds: Normal breath sounds. No stridor. No wheezing, rhonchi or rales.   Chest:   Breasts:      Right: No supraclavicular adenopathy.      Left: No supraclavicular adenopathy.       Abdominal:      General: Bowel sounds are normal. There is no distension.      Palpations: Abdomen is soft.      Tenderness: There is no abdominal tenderness. There is no guarding or rebound.   Musculoskeletal:         General: No swelling or deformity.      Cervical back: Neck supple.   Lymphadenopathy:      Head:      Right side of head: No submandibular or posterior auricular adenopathy.      Left side of head: No submandibular or posterior auricular adenopathy.      Cervical: No cervical adenopathy.      Right cervical: No superficial, deep or posterior cervical adenopathy.     Left cervical: No superficial, deep or posterior cervical adenopathy.      Upper Body:      Right upper body: No supraclavicular adenopathy.      Left upper body: No supraclavicular adenopathy.   Skin:     General: Skin is warm and dry.   Neurological:      General: No focal deficit present.      Mental Status: She is alert. Mental status is at baseline.      Gait: Gait normal.   Psychiatric:         Mood and Affect: Mood normal.         Behavior: Behavior normal.         Assessment:       1. Encounter for weight loss counseling    2. Family history of sickle cell disease    3. Health maintenance examination    4. Screening for diabetes mellitus    5. Screening for cardiovascular condition    6. Screening for HIV without presence of risk factors    7. Encounter for hepatitis C screening test for low risk patient    8. Sexually transmitted disease exposure        Plan:       Encounter for weight loss counseling  Discussed the following:     Goal of at least 150 minutes aerobic exercise weekly. Recommend 30 mins, 5 days weekly.     Target heart  rate while performing aerobic activity.      Organizing plate with half vegetables, quarter whole grain starches or starchy vegetables, and quarter lean protein.      Goal of at least 1 vegetarian meal weekly and emphasizing fresh vegetables and fruits in diet.      Weight loss goal of 1-2 lbs per week. Goal by next appt in 12 weeks is 10 lbs.      Increasing healthy based fats such as avocados, olive oil, nuts, and freshwater, cold-water fish.     Decreasing red meat to no more than once weekly.   Start Qsymia 3.75 mg x2 weeks, then 7.5 mg x12 weeks  RTC in 3 months for follow up  -     CBC Auto Differential; Future  -     Comprehensive Metabolic Panel; Future  -     TSH; Future  -     phentermine-topiramate (QSYMIA) 3.75-23 mg CM24; Take 3.75 mg by mouth once daily. for 14 days  -     phentermine-topiramate (QSYMIA) 7.5-46 mg CM24; Take 7.5 mg by mouth once daily.    Family history of sickle cell disease  -     SICKLE CELL SCREEN; Future    Health maintenance examination  RTC in 1 year for annual appointment, sooner PRN.    Screening for diabetes mellitus  -     Hemoglobin A1C; Future    Screening for cardiovascular condition  -     Lipid Panel; Future    Screening for HIV without presence of risk factors  -     HIV 1/2 Ag/Ab (4th Gen); Future    Encounter for hepatitis C screening test for low risk patient  -     Hepatitis C Antibody; Future    Sexually transmitted disease exposure  -     RPR; Future  -     Hepatitis B Surface Antigen; Future  -     C. trachomatis/N. gonorrhoeae by AMP DNA [facility]; Urine; Future      Rosa Bonner MD  2/10/2022

## 2022-02-11 LAB
HBV SURFACE AG SERPL QL IA: NEGATIVE
HCV AB SERPL QL IA: NEGATIVE
HIV 1+2 AB+HIV1 P24 AG SERPL QL IA: NEGATIVE
RPR SER QL: NORMAL

## 2022-02-15 DIAGNOSIS — Z20.2 EXPOSURE TO SEXUALLY TRANSMITTED DISEASE (STD): Primary | ICD-10-CM

## 2022-02-15 RX ORDER — PHENTERMINE AND TOPIRAMATE 7.5; 46 MG/1; MG/1
7.5 CAPSULE, EXTENDED RELEASE ORAL DAILY
Qty: 84 CAPSULE | Refills: 0 | Status: SHIPPED | OUTPATIENT
Start: 2022-03-01 | End: 2022-04-08 | Stop reason: SDUPTHER

## 2022-02-15 RX ORDER — PHENTERMINE AND TOPIRAMATE 3.75; 23 MG/1; MG/1
3.75 CAPSULE, EXTENDED RELEASE ORAL DAILY
Qty: 14 CAPSULE | Refills: 0 | Status: SHIPPED | OUTPATIENT
Start: 2022-02-15 | End: 2022-03-01

## 2022-02-16 ENCOUNTER — LAB VISIT (OUTPATIENT)
Dept: LAB | Facility: OTHER | Age: 32
End: 2022-02-16
Payer: MEDICAID

## 2022-02-16 ENCOUNTER — TELEPHONE (OUTPATIENT)
Dept: PHARMACY | Facility: CLINIC | Age: 32
End: 2022-02-16
Payer: MEDICAID

## 2022-02-16 DIAGNOSIS — Z20.2 EXPOSURE TO SEXUALLY TRANSMITTED DISEASE (STD): ICD-10-CM

## 2022-02-16 PROCEDURE — 87491 CHLMYD TRACH DNA AMP PROBE: CPT | Performed by: STUDENT IN AN ORGANIZED HEALTH CARE EDUCATION/TRAINING PROGRAM

## 2022-02-16 PROCEDURE — 87591 N.GONORRHOEAE DNA AMP PROB: CPT | Performed by: STUDENT IN AN ORGANIZED HEALTH CARE EDUCATION/TRAINING PROGRAM

## 2022-02-18 LAB
C TRACH DNA SPEC QL NAA+PROBE: NOT DETECTED
N GONORRHOEA DNA SPEC QL NAA+PROBE: NOT DETECTED

## 2022-04-08 ENCOUNTER — TELEPHONE (OUTPATIENT)
Dept: INTERNAL MEDICINE | Facility: CLINIC | Age: 32
End: 2022-04-08
Payer: MEDICAID

## 2022-04-08 DIAGNOSIS — Z71.3 ENCOUNTER FOR WEIGHT LOSS COUNSELING: ICD-10-CM

## 2022-04-08 RX ORDER — PHENTERMINE AND TOPIRAMATE 3.75; 23 MG/1; MG/1
3.75 CAPSULE, EXTENDED RELEASE ORAL DAILY
Qty: 14 CAPSULE | Refills: 0 | Status: SHIPPED | OUTPATIENT
Start: 2022-04-08 | End: 2022-04-22

## 2022-04-08 RX ORDER — PHENTERMINE AND TOPIRAMATE 7.5; 46 MG/1; MG/1
7.5 CAPSULE, EXTENDED RELEASE ORAL DAILY
Qty: 84 CAPSULE | Refills: 0 | Status: SHIPPED | OUTPATIENT
Start: 2022-04-22 | End: 2022-07-15

## 2022-04-08 NOTE — TELEPHONE ENCOUNTER
Medication Qysmia is not covered under patients insurance is there an alternative medication that can be prescribed. Routed to Dr. Bonner for further instructions. Thanks Please see message below.

## 2022-04-08 NOTE — TELEPHONE ENCOUNTER
----- Message from Joseph Klein sent at 4/8/2022  3:13 PM CDT -----  Regarding: Qsymia  Hi messaging to inform that the Qsymia is not covered under patient's insurance

## 2022-04-21 LAB — HPV MRNA E6/E7: DETECTED

## 2022-07-01 ENCOUNTER — PATIENT MESSAGE (OUTPATIENT)
Dept: INTERNAL MEDICINE | Facility: CLINIC | Age: 32
End: 2022-07-01
Payer: MEDICAID

## 2022-07-01 ENCOUNTER — LAB VISIT (OUTPATIENT)
Dept: LAB | Facility: OTHER | Age: 32
End: 2022-07-01
Payer: MEDICAID

## 2022-07-01 ENCOUNTER — TELEPHONE (OUTPATIENT)
Dept: INTERNAL MEDICINE | Facility: CLINIC | Age: 32
End: 2022-07-01
Payer: MEDICAID

## 2022-07-01 DIAGNOSIS — Z20.2 EXPOSURE TO SEXUALLY TRANSMITTED DISEASE (STD): ICD-10-CM

## 2022-07-01 DIAGNOSIS — Z11.4 SCREENING FOR HIV WITHOUT PRESENCE OF RISK FACTORS: ICD-10-CM

## 2022-07-01 DIAGNOSIS — N89.8 VAGINAL DISCHARGE: ICD-10-CM

## 2022-07-01 DIAGNOSIS — Z13.0 SCREENING FOR SICKLE-CELL DISEASE OR TRAIT: ICD-10-CM

## 2022-07-01 DIAGNOSIS — R35.0 URINARY FREQUENCY: Primary | ICD-10-CM

## 2022-07-01 LAB — RPR SER QL: NORMAL

## 2022-07-01 PROCEDURE — 36415 COLL VENOUS BLD VENIPUNCTURE: CPT | Performed by: STUDENT IN AN ORGANIZED HEALTH CARE EDUCATION/TRAINING PROGRAM

## 2022-07-01 PROCEDURE — 83020 HEMOGLOBIN ELECTROPHORESIS: CPT | Performed by: STUDENT IN AN ORGANIZED HEALTH CARE EDUCATION/TRAINING PROGRAM

## 2022-07-01 PROCEDURE — 87340 HEPATITIS B SURFACE AG IA: CPT | Performed by: STUDENT IN AN ORGANIZED HEALTH CARE EDUCATION/TRAINING PROGRAM

## 2022-07-01 PROCEDURE — 86592 SYPHILIS TEST NON-TREP QUAL: CPT | Performed by: STUDENT IN AN ORGANIZED HEALTH CARE EDUCATION/TRAINING PROGRAM

## 2022-07-01 PROCEDURE — 83020 HEMOGLOBIN ELECTROPHORESIS: CPT | Mod: 91 | Performed by: STUDENT IN AN ORGANIZED HEALTH CARE EDUCATION/TRAINING PROGRAM

## 2022-07-01 PROCEDURE — 87389 HIV-1 AG W/HIV-1&-2 AB AG IA: CPT | Performed by: STUDENT IN AN ORGANIZED HEALTH CARE EDUCATION/TRAINING PROGRAM

## 2022-07-01 RX ORDER — FLUCONAZOLE 150 MG/1
150 TABLET ORAL DAILY
Qty: 1 TABLET | Refills: 0 | Status: SHIPPED | OUTPATIENT
Start: 2022-07-01 | End: 2022-07-02

## 2022-07-01 NOTE — TELEPHONE ENCOUNTER
Vaginal pruritus and urinary frequency.     Ordered UA and STI testing  Diflucan to pharmacy.    Please assist with scheduling labs, thank you!

## 2022-07-04 LAB — HIV 1+2 AB+HIV1 P24 AG SERPL QL IA: NEGATIVE

## 2022-07-05 LAB — HBV SURFACE AG SERPL QL IA: NEGATIVE

## 2022-07-06 LAB
HGB A2 MFR BLD HPLC: 3 % (ref 2.2–3.2)
HGB FRACT BLD ELPH-IMP: NORMAL
HGB FRACT BLD ELPH-IMP: NORMAL

## 2022-07-12 LAB — HGB FRACT BLD ELPH PH6.0-IMP: NORMAL

## 2022-08-03 DIAGNOSIS — R09.89 RUNNY NOSE: Primary | ICD-10-CM

## 2022-08-03 LAB
CTP QC/QA: YES
SARS-COV-2 AG RESP QL IA.RAPID: NEGATIVE

## 2022-08-24 ENCOUNTER — PATIENT MESSAGE (OUTPATIENT)
Dept: INTERNAL MEDICINE | Facility: CLINIC | Age: 32
End: 2022-08-24
Payer: MEDICAID

## 2022-10-10 ENCOUNTER — PATIENT MESSAGE (OUTPATIENT)
Dept: INTERNAL MEDICINE | Facility: CLINIC | Age: 32
End: 2022-10-10
Payer: MEDICAID

## 2022-10-13 ENCOUNTER — PATIENT OUTREACH (OUTPATIENT)
Dept: ADMINISTRATIVE | Facility: HOSPITAL | Age: 32
End: 2022-10-13
Payer: MEDICAID

## 2023-01-18 ENCOUNTER — PATIENT MESSAGE (OUTPATIENT)
Dept: ADMINISTRATIVE | Facility: HOSPITAL | Age: 33
End: 2023-01-18
Payer: MEDICAID

## 2023-03-22 ENCOUNTER — LAB VISIT (OUTPATIENT)
Dept: LAB | Facility: OTHER | Age: 33
End: 2023-03-22
Attending: STUDENT IN AN ORGANIZED HEALTH CARE EDUCATION/TRAINING PROGRAM
Payer: MEDICAID

## 2023-03-22 ENCOUNTER — OFFICE VISIT (OUTPATIENT)
Dept: INTERNAL MEDICINE | Facility: CLINIC | Age: 33
End: 2023-03-22
Payer: MEDICAID

## 2023-03-22 VITALS
WEIGHT: 229.5 LBS | HEIGHT: 63 IN | DIASTOLIC BLOOD PRESSURE: 60 MMHG | BODY MASS INDEX: 40.66 KG/M2 | OXYGEN SATURATION: 99 % | SYSTOLIC BLOOD PRESSURE: 100 MMHG | HEART RATE: 75 BPM

## 2023-03-22 DIAGNOSIS — Z00.00 HEALTH MAINTENANCE EXAMINATION: Primary | ICD-10-CM

## 2023-03-22 DIAGNOSIS — Z11.59 ENCOUNTER FOR HEPATITIS C SCREENING TEST FOR LOW RISK PATIENT: ICD-10-CM

## 2023-03-22 DIAGNOSIS — Z13.1 SCREENING FOR DIABETES MELLITUS: ICD-10-CM

## 2023-03-22 DIAGNOSIS — Z13.6 SCREENING FOR CARDIOVASCULAR CONDITION: ICD-10-CM

## 2023-03-22 DIAGNOSIS — Z20.2 SEXUALLY TRANSMITTED DISEASE EXPOSURE: ICD-10-CM

## 2023-03-22 DIAGNOSIS — B00.9 HSV-2 INFECTION: ICD-10-CM

## 2023-03-22 DIAGNOSIS — Z71.3 ENCOUNTER FOR WEIGHT LOSS COUNSELING: ICD-10-CM

## 2023-03-22 DIAGNOSIS — Z00.00 HEALTH MAINTENANCE EXAMINATION: ICD-10-CM

## 2023-03-22 DIAGNOSIS — Z11.4 SCREENING FOR HIV WITHOUT PRESENCE OF RISK FACTORS: ICD-10-CM

## 2023-03-22 LAB
C TRACH DNA SPEC QL NAA+PROBE: NOT DETECTED
N GONORRHOEA DNA SPEC QL NAA+PROBE: NOT DETECTED

## 2023-03-22 PROCEDURE — 3078F PR MOST RECENT DIASTOLIC BLOOD PRESSURE < 80 MM HG: ICD-10-PCS | Mod: CPTII,,, | Performed by: STUDENT IN AN ORGANIZED HEALTH CARE EDUCATION/TRAINING PROGRAM

## 2023-03-22 PROCEDURE — 99395 PR PREVENTIVE VISIT,EST,18-39: ICD-10-PCS | Mod: 25,S$PBB,, | Performed by: STUDENT IN AN ORGANIZED HEALTH CARE EDUCATION/TRAINING PROGRAM

## 2023-03-22 PROCEDURE — 1160F PR REVIEW ALL MEDS BY PRESCRIBER/CLIN PHARMACIST DOCUMENTED: ICD-10-PCS | Mod: CPTII,,, | Performed by: STUDENT IN AN ORGANIZED HEALTH CARE EDUCATION/TRAINING PROGRAM

## 2023-03-22 PROCEDURE — 3008F BODY MASS INDEX DOCD: CPT | Mod: CPTII,,, | Performed by: STUDENT IN AN ORGANIZED HEALTH CARE EDUCATION/TRAINING PROGRAM

## 2023-03-22 PROCEDURE — 3008F PR BODY MASS INDEX (BMI) DOCUMENTED: ICD-10-PCS | Mod: CPTII,,, | Performed by: STUDENT IN AN ORGANIZED HEALTH CARE EDUCATION/TRAINING PROGRAM

## 2023-03-22 PROCEDURE — 3078F DIAST BP <80 MM HG: CPT | Mod: CPTII,,, | Performed by: STUDENT IN AN ORGANIZED HEALTH CARE EDUCATION/TRAINING PROGRAM

## 2023-03-22 PROCEDURE — 1159F MED LIST DOCD IN RCRD: CPT | Mod: CPTII,,, | Performed by: STUDENT IN AN ORGANIZED HEALTH CARE EDUCATION/TRAINING PROGRAM

## 2023-03-22 PROCEDURE — 99395 PREV VISIT EST AGE 18-39: CPT | Mod: 25,S$PBB,, | Performed by: STUDENT IN AN ORGANIZED HEALTH CARE EDUCATION/TRAINING PROGRAM

## 2023-03-22 PROCEDURE — 99213 OFFICE O/P EST LOW 20 MIN: CPT | Mod: PBBFAC | Performed by: STUDENT IN AN ORGANIZED HEALTH CARE EDUCATION/TRAINING PROGRAM

## 2023-03-22 PROCEDURE — 99213 OFFICE O/P EST LOW 20 MIN: CPT | Mod: S$PBB,25,, | Performed by: STUDENT IN AN ORGANIZED HEALTH CARE EDUCATION/TRAINING PROGRAM

## 2023-03-22 PROCEDURE — 3074F PR MOST RECENT SYSTOLIC BLOOD PRESSURE < 130 MM HG: ICD-10-PCS | Mod: CPTII,,, | Performed by: STUDENT IN AN ORGANIZED HEALTH CARE EDUCATION/TRAINING PROGRAM

## 2023-03-22 PROCEDURE — 87591 N.GONORRHOEAE DNA AMP PROB: CPT | Performed by: STUDENT IN AN ORGANIZED HEALTH CARE EDUCATION/TRAINING PROGRAM

## 2023-03-22 PROCEDURE — 99999 PR PBB SHADOW E&M-EST. PATIENT-LVL III: ICD-10-PCS | Mod: PBBFAC,,, | Performed by: STUDENT IN AN ORGANIZED HEALTH CARE EDUCATION/TRAINING PROGRAM

## 2023-03-22 PROCEDURE — 1160F RVW MEDS BY RX/DR IN RCRD: CPT | Mod: CPTII,,, | Performed by: STUDENT IN AN ORGANIZED HEALTH CARE EDUCATION/TRAINING PROGRAM

## 2023-03-22 PROCEDURE — 99999 PR PBB SHADOW E&M-EST. PATIENT-LVL III: CPT | Mod: PBBFAC,,, | Performed by: STUDENT IN AN ORGANIZED HEALTH CARE EDUCATION/TRAINING PROGRAM

## 2023-03-22 PROCEDURE — 3074F SYST BP LT 130 MM HG: CPT | Mod: CPTII,,, | Performed by: STUDENT IN AN ORGANIZED HEALTH CARE EDUCATION/TRAINING PROGRAM

## 2023-03-22 PROCEDURE — 1159F PR MEDICATION LIST DOCUMENTED IN MEDICAL RECORD: ICD-10-PCS | Mod: CPTII,,, | Performed by: STUDENT IN AN ORGANIZED HEALTH CARE EDUCATION/TRAINING PROGRAM

## 2023-03-22 PROCEDURE — 99213 PR OFFICE/OUTPT VISIT, EST, LEVL III, 20-29 MIN: ICD-10-PCS | Mod: S$PBB,25,, | Performed by: STUDENT IN AN ORGANIZED HEALTH CARE EDUCATION/TRAINING PROGRAM

## 2023-03-22 RX ORDER — CETIRIZINE HYDROCHLORIDE 10 MG/1
TABLET ORAL
COMMUNITY

## 2023-03-22 NOTE — PROGRESS NOTES
Subjective:       Patient ID: Kim Flynn is a 32 y.o. female.    Chief Complaint: Health maintenance examination [Z00.00]    Patient is established with me, here today for the following:    Health maintenance -   Denies family history of colorectal cancer.  Denies family history of breast cancer.  Denies family history of ovarian cancer.  Last pap performed .   History of abnormal pap smear.  Most recent pap LSIL and HPV positive.  Denies family history of osteoporosis.  Denies significant family history of cardiac disease.  UTD on influenza, Tdap, COVID primary, HPV vaccinations.  Due for COVID bivalent vaccinations.  Never smoker.  Drinks alcohol 1 times monthly, 1-5 drinks per sitting.  Denies drug use.  Completed HIV and hepatitis C screening.  Due for lipid screening.  Lab Results       Component                Value               Date                       LDLCALC                  89.0                02/10/2022            Due for diabetes screening.  Lab Results       Component                Value               Date                       HGBA1C                   5.5                 02/10/2022              Has regular menstrual cycles monthly.  Menstruation lasts for 7 days.  Denies menorrhagia or requiring more than 5 pads or tampons in a single day.  Began menarche at age 10.   [id number]  Endorses gestational diabetes.   Endorses preeclampsia.    section at 35 w[facility] for preeclampsia.   Not currently sexually active, male partners.  Not currently using contraception.  Agreeable to routine STI testing.     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  BMR: 1,723  kcal/day  Goal calories per day: 1350 - 1550 kcal/day    Exercise:  Not currently exercising routinely.  Target HR: 161 bpm    Has previously tried Adipex for weight reduction,  "experienced side effects.  Denies personal history of pancreatitis   Dana family history of thyroid cancer or MEN syndrome  No prior weight reduction surgeries    Wt Readings from Last 5 Encounters:  03/22/23 : 104.1 kg (229 lb 8 oz)  02/10/22 : 101 kg (222 lb 10.6 oz)  01/03/22 : 101.6 kg (224 lb)  10/14/21 : 98.9 kg (218 lb)  08/30/19 : 104.3 kg (230 lb)    Lab Results       Component                Value               Date                       HGBA1C                   5.5                 02/10/2022            Lab Results       Component                Value               Date                       LDLCALC                  89.0                02/10/2022              Metabolic syndrome:   Waist circumference   HDL cholesterol <50 mg/dL      Review of Systems   Constitutional:  Negative for appetite change, chills, fatigue, fever and unexpected weight change.   Respiratory:  Negative for cough and shortness of breath.    Cardiovascular:  Negative for chest pain, palpitations and leg swelling.   Gastrointestinal:  Negative for abdominal pain, constipation, diarrhea, nausea and vomiting.   Skin:  Negative for rash.   Neurological:  Negative for dizziness, syncope, weakness and headaches.       Current Outpatient Medications   Medication Instructions    cetirizine (ZYRTEC) 10 MG tablet No dose, route, or frequency recorded.    fexofenadine (ALLEGRA) 60 mg, Oral, Daily    loratadine (CLARITIN) 10 mg, Oral, Daily    naltrexone-bupropion (CONTRAVE) 8-90 mg TbSR Take 1 tablet by mouth Daily for 14 days, THEN 1 tablet 2 (two) times daily for 14 days.     Objective:      Vitals:    03/22/23 0817   BP: 100/60   Pulse: 75   SpO2: 99%   Weight: 104.1 kg (229 lb 8 oz)   Height: 5' 3" (1.6 m)   PainSc: 0-No pain     Body mass index is 40.65 kg/m².    Physical Exam  Vitals reviewed.   Constitutional:       General: She is not in acute distress.     Appearance: Normal appearance. She is not ill-appearing or diaphoretic.   HENT: "      Head: Normocephalic and atraumatic.      Right Ear: Tympanic membrane, ear canal and external ear normal. There is no impacted cerumen.      Left Ear: Tympanic membrane, ear canal and external ear normal. There is no impacted cerumen.      Nose: Nose normal. No rhinorrhea.      Mouth/Throat:      Mouth: Mucous membranes are moist.      Pharynx: Oropharynx is clear. No oropharyngeal exudate or posterior oropharyngeal erythema.   Eyes:      General: No scleral icterus.        Right eye: No discharge.         Left eye: No discharge.      Conjunctiva/sclera: Conjunctivae normal.   Neck:      Thyroid: No thyromegaly or thyroid tenderness.      Trachea: Trachea normal.   Cardiovascular:      Rate and Rhythm: Normal rate and regular rhythm.      Heart sounds: Normal heart sounds. No murmur heard.    No friction rub. No gallop.   Pulmonary:      Effort: Pulmonary effort is normal. No respiratory distress.      Breath sounds: Normal breath sounds. No stridor. No wheezing, rhonchi or rales.   Abdominal:      General: There is no distension.      Palpations: Abdomen is soft.      Tenderness: There is no abdominal tenderness. There is no guarding or rebound.   Musculoskeletal:         General: No swelling or deformity.      Cervical back: Neck supple.   Lymphadenopathy:      Head:      Right side of head: No submandibular or posterior auricular adenopathy.      Left side of head: No submandibular or posterior auricular adenopathy.      Cervical: No cervical adenopathy.      Right cervical: No superficial, deep or posterior cervical adenopathy.     Left cervical: No superficial, deep or posterior cervical adenopathy.      Upper Body:      Right upper body: No supraclavicular adenopathy.      Left upper body: No supraclavicular adenopathy.   Skin:     General: Skin is warm and dry.   Neurological:      General: No focal deficit present.      Mental Status: She is alert. Mental status is at baseline.      Gait: Gait normal.    Psychiatric:         Mood and Affect: Mood normal.         Behavior: Behavior normal.       Assessment:       1. Health maintenance examination    2. Encounter for weight loss counseling    3. HSV-2 infection    4. Sexually transmitted disease exposure    5. Screening for HIV without presence of risk factors    6. Encounter for hepatitis C screening test for low risk patient    7. Screening for diabetes mellitus    8. Screening for cardiovascular condition        Plan:       Encounter for weight loss counseling  Start Contrave 8/90 mg once daily.  If tolerating medication without side effects, increase to Contrave 8/90 mg twice daily after 14 days.   Recommend the following:      Goal of at least 150 minutes aerobic exercise weekly. Recommend 30 mins, 5 days weekly.      Target heart rate while performing aerobic activity 161 bpm.       Monitoring caloric intake for 1-2 days weekly, with a goal of 1350 - 1550 kcal/day.       Organizing plate with half vegetables, quarter whole grain starches or starchy vegetables, and quarter lean protein.       Goal of at least 1 vegetarian meal weekly and emphasizing fresh vegetables and fruits in diet.       Weight loss goal of 1-2 lbs per week. Goal by next appt in 12 weeks is 15 lbs.        Increasing healthy based fats such as avocados, olive oil, nuts, and freshwater, cold-water fish.      Decreasing red meat to no more than once weekly.       RTC in 3 months for weight management follow up.   -     naltrexone-bupropion (CONTRAVE) 8-90 mg TbSR; Take 1 tablet by mouth Daily for 14 days, THEN 1 tablet 2 (two) times daily for 14 days.    HSV-2 infection  Valtrex PRN  If >3 outbreaks in 1 year, consider daily therapy     Health maintenance examination  Reviewed and discussed age appropriate screenings and immunizations.  -     Comprehensive Metabolic Panel; Future  -     TSH; Future  -     Lipid Panel; Future  -     Hemoglobin A1C; Future  -     CBC Auto Differential; Future  -      HIV 1/2 Ag/Ab (4th Gen); Future  -     RPR; Future  -     Hepatitis B Surface Antigen; Future  -     Hepatitis C Antibody; Future  -     C. trachomatis/N. gonorrhoeae by AMP DNA [facility]; Urine; Future    Sexually transmitted disease exposure  -     RPR; Future  -     Hepatitis B Surface Antigen; Future  -     C. trachomatis/N. gonorrhoeae by AMP DNA [facility]; Urine; Future    Screening for HIV without presence of risk factors  -     HIV 1/2 Ag/Ab (4th Gen); Future    Encounter for hepatitis C screening test for low risk patient  -     Hepatitis C Antibody; Future    Screening for diabetes mellitus  -     Hemoglobin A1C; Future    Screening for cardiovascular condition  -     Lipid Panel; Future        Rosa Bonner MD  3/22/2023

## 2023-04-11 ENCOUNTER — HOSPITAL ENCOUNTER (EMERGENCY)
Facility: HOSPITAL | Age: 33
Discharge: HOME | End: 2023-04-11
Attending: EMERGENCY MEDICINE
Payer: MEDICAID

## 2023-04-11 VITALS
BODY MASS INDEX: 40.57 KG/M2 | DIASTOLIC BLOOD PRESSURE: 98 MMHG | SYSTOLIC BLOOD PRESSURE: 162 MMHG | WEIGHT: 229 LBS | HEART RATE: 82 BPM | OXYGEN SATURATION: 98 % | TEMPERATURE: 99 F | HEIGHT: 63 IN | RESPIRATION RATE: 16 BRPM

## 2023-04-11 DIAGNOSIS — M54.2 NECK PAIN: ICD-10-CM

## 2023-04-11 DIAGNOSIS — S39.012A BACK STRAIN, INITIAL ENCOUNTER: ICD-10-CM
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
## 2023-04-11 LAB
B-HCG UR QL: NEGATIVE
CTP QC/QA: YES

## 2023-04-11 PROCEDURE — 81025 URINE PREGNANCY TEST: CPT | Mod: ER | Performed by: NURSE PRACTITIONER

## 2023-04-11 PROCEDURE — 25000003 PHARM REV CODE 250: Mod: ER | Performed by: NURSE PRACTITIONER

## 2023-04-11 PROCEDURE — 99284 EMERGENCY DEPT VISIT MOD MDM: CPT | Mod: 25,ER

## 2023-04-11 RX ORDER — SULINDAC 150 MG/1
150 TABLET ORAL 2 TIMES DAILY
Qty: 10 TABLET | Refills: 0 | Status: SHIPPED | OUTPATIENT
Start: 2023-04-11 | End: 2023-04-16

## 2023-04-11 RX ORDER — NAPROXEN 250 MG/1
250 TABLET ORAL
Status: COMPLETED | OUTPATIENT
Start: 2023-04-11 | End: 2023-04-11

## 2023-04-11 RX ORDER — CYCLOBENZAPRINE HCL 10 MG
10 TABLET ORAL 3 TIMES DAILY PRN
Qty: 15 TABLET | Refills: 0 | Status: SHIPPED | OUTPATIENT
Start: 2023-04-11 | End: 2023-04-16

## 2023-04-11 RX ADMIN — NAPROXEN 250 MG: 250 TABLET ORAL at 06:04

## 2023-04-11 NOTE — DISCHARGE INSTRUCTIONS
You have been prescribed clinoril (sulindac), an anti-inflammatory.  Take this medication whether you feel you need it or not.  Do not take ibuprofen, naproxen or other NSAID's medications while taking this medication. You have also been prescribed flexeril (cyclobenzaprine).  You have been given a medication that causes drowsiness.  Do not operate motor vehicles, drink alcohol, or operate heavy machinery while taking this medication. Return to the Emergency Department for any worsening, change in condition, or any emergent concerns.   Do not take prescribed medications for at least 8h after medications given in the Emergency Department.

## 2023-04-11 NOTE — Clinical Note
"Kim Cespedes" Gee was seen and treated in our emergency department on 4/11/2023.  She may return to work on 04/12/2023.       If you have any questions or concerns, please don't hesitate to call.      HANNAH Rodas RN    "
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
## 2023-04-12 ENCOUNTER — PATIENT MESSAGE (OUTPATIENT)
Dept: ADMINISTRATIVE | Facility: HOSPITAL | Age: 33
End: 2023-04-12
Payer: MEDICAID

## 2023-05-17 ENCOUNTER — TELEPHONE (OUTPATIENT)
Dept: INTERNAL MEDICINE | Facility: CLINIC | Age: 33
End: 2023-05-17
Payer: MEDICAID

## 2023-05-17 DIAGNOSIS — Z20.2 EXPOSURE TO SEXUALLY TRANSMITTED DISEASE (STD): Primary | ICD-10-CM

## 2023-05-18 ENCOUNTER — LAB VISIT (OUTPATIENT)
Dept: LAB | Facility: OTHER | Age: 33
End: 2023-05-18
Attending: STUDENT IN AN ORGANIZED HEALTH CARE EDUCATION/TRAINING PROGRAM
Payer: MEDICAID

## 2023-05-18 DIAGNOSIS — Z20.2 EXPOSURE TO SEXUALLY TRANSMITTED DISEASE (STD): ICD-10-CM

## 2023-05-18 LAB
HBV SURFACE AG SERPL QL IA: NORMAL
HCV AB SERPL QL IA: NORMAL
HIV 1+2 AB+HIV1 P24 AG SERPL QL IA: NORMAL

## 2023-05-18 PROCEDURE — 86592 SYPHILIS TEST NON-TREP QUAL: CPT | Performed by: STUDENT IN AN ORGANIZED HEALTH CARE EDUCATION/TRAINING PROGRAM

## 2023-05-18 PROCEDURE — 87340 HEPATITIS B SURFACE AG IA: CPT | Performed by: STUDENT IN AN ORGANIZED HEALTH CARE EDUCATION/TRAINING PROGRAM

## 2023-05-18 PROCEDURE — 87389 HIV-1 AG W/HIV-1&-2 AB AG IA: CPT | Performed by: STUDENT IN AN ORGANIZED HEALTH CARE EDUCATION/TRAINING PROGRAM

## 2023-05-18 PROCEDURE — 86803 HEPATITIS C AB TEST: CPT | Performed by: STUDENT IN AN ORGANIZED HEALTH CARE EDUCATION/TRAINING PROGRAM

## 2023-05-18 PROCEDURE — 36415 COLL VENOUS BLD VENIPUNCTURE: CPT | Performed by: STUDENT IN AN ORGANIZED HEALTH CARE EDUCATION/TRAINING PROGRAM

## 2023-05-19 LAB — RPR SER QL: NORMAL

## 2023-05-24 DIAGNOSIS — B00.9 HSV INFECTION: Primary | ICD-10-CM

## 2023-05-24 RX ORDER — VALACYCLOVIR HYDROCHLORIDE 1 G/1
1000 TABLET, FILM COATED ORAL DAILY
Qty: 5 TABLET | Refills: 0 | Status: SHIPPED | OUTPATIENT
Start: 2023-05-24 | End: 2023-06-14 | Stop reason: SDUPTHER

## 2023-06-14 ENCOUNTER — OFFICE VISIT (OUTPATIENT)
Dept: INTERNAL MEDICINE | Facility: CLINIC | Age: 33
End: 2023-06-14
Payer: MEDICAID

## 2023-06-14 VITALS
BODY MASS INDEX: 40.57 KG/M2 | HEART RATE: 76 BPM | SYSTOLIC BLOOD PRESSURE: 122 MMHG | OXYGEN SATURATION: 99 % | DIASTOLIC BLOOD PRESSURE: 90 MMHG | WEIGHT: 229 LBS | HEIGHT: 63 IN

## 2023-06-14 DIAGNOSIS — M54.50 ACUTE MIDLINE LOW BACK PAIN WITHOUT SCIATICA: Primary | ICD-10-CM

## 2023-06-14 DIAGNOSIS — B00.9 HSV INFECTION: ICD-10-CM

## 2023-06-14 DIAGNOSIS — M54.9 DORSALGIA, UNSPECIFIED: ICD-10-CM

## 2023-06-14 PROCEDURE — 3080F DIAST BP >= 90 MM HG: CPT | Mod: CPTII,,, | Performed by: STUDENT IN AN ORGANIZED HEALTH CARE EDUCATION/TRAINING PROGRAM

## 2023-06-14 PROCEDURE — 3008F PR BODY MASS INDEX (BMI) DOCUMENTED: ICD-10-PCS | Mod: CPTII,,, | Performed by: STUDENT IN AN ORGANIZED HEALTH CARE EDUCATION/TRAINING PROGRAM

## 2023-06-14 PROCEDURE — 99214 OFFICE O/P EST MOD 30 MIN: CPT | Mod: S$PBB,,, | Performed by: STUDENT IN AN ORGANIZED HEALTH CARE EDUCATION/TRAINING PROGRAM

## 2023-06-14 PROCEDURE — 99999 PR PBB SHADOW E&M-EST. PATIENT-LVL III: ICD-10-PCS | Mod: PBBFAC,,, | Performed by: STUDENT IN AN ORGANIZED HEALTH CARE EDUCATION/TRAINING PROGRAM

## 2023-06-14 PROCEDURE — 3008F BODY MASS INDEX DOCD: CPT | Mod: CPTII,,, | Performed by: STUDENT IN AN ORGANIZED HEALTH CARE EDUCATION/TRAINING PROGRAM

## 2023-06-14 PROCEDURE — 99214 PR OFFICE/OUTPT VISIT, EST, LEVL IV, 30-39 MIN: ICD-10-PCS | Mod: S$PBB,,, | Performed by: STUDENT IN AN ORGANIZED HEALTH CARE EDUCATION/TRAINING PROGRAM

## 2023-06-14 PROCEDURE — 99999 PR PBB SHADOW E&M-EST. PATIENT-LVL III: CPT | Mod: PBBFAC,,, | Performed by: STUDENT IN AN ORGANIZED HEALTH CARE EDUCATION/TRAINING PROGRAM

## 2023-06-14 PROCEDURE — 3044F PR MOST RECENT HEMOGLOBIN A1C LEVEL <7.0%: ICD-10-PCS | Mod: CPTII,,, | Performed by: STUDENT IN AN ORGANIZED HEALTH CARE EDUCATION/TRAINING PROGRAM

## 2023-06-14 PROCEDURE — 3074F SYST BP LT 130 MM HG: CPT | Mod: CPTII,,, | Performed by: STUDENT IN AN ORGANIZED HEALTH CARE EDUCATION/TRAINING PROGRAM

## 2023-06-14 PROCEDURE — 3074F PR MOST RECENT SYSTOLIC BLOOD PRESSURE < 130 MM HG: ICD-10-PCS | Mod: CPTII,,, | Performed by: STUDENT IN AN ORGANIZED HEALTH CARE EDUCATION/TRAINING PROGRAM

## 2023-06-14 PROCEDURE — 3080F PR MOST RECENT DIASTOLIC BLOOD PRESSURE >= 90 MM HG: ICD-10-PCS | Mod: CPTII,,, | Performed by: STUDENT IN AN ORGANIZED HEALTH CARE EDUCATION/TRAINING PROGRAM

## 2023-06-14 PROCEDURE — 99213 OFFICE O/P EST LOW 20 MIN: CPT | Mod: PBBFAC | Performed by: STUDENT IN AN ORGANIZED HEALTH CARE EDUCATION/TRAINING PROGRAM

## 2023-06-14 PROCEDURE — 3044F HG A1C LEVEL LT 7.0%: CPT | Mod: CPTII,,, | Performed by: STUDENT IN AN ORGANIZED HEALTH CARE EDUCATION/TRAINING PROGRAM

## 2023-06-14 RX ORDER — VALACYCLOVIR HYDROCHLORIDE 500 MG/1
500 TABLET, FILM COATED ORAL DAILY
Qty: 90 TABLET | Refills: 1 | Status: SHIPPED | OUTPATIENT
Start: 2023-06-14

## 2023-06-14 RX ORDER — VALACYCLOVIR HYDROCHLORIDE 500 MG/1
500 TABLET, FILM COATED ORAL DAILY
Qty: 90 TABLET | Refills: 1 | Status: SHIPPED | OUTPATIENT
Start: 2023-06-14 | End: 2023-06-14

## 2023-06-14 RX ORDER — METHOCARBAMOL 750 MG/1
750 TABLET, FILM COATED ORAL 3 TIMES DAILY PRN
Qty: 40 TABLET | Refills: 0 | Status: SHIPPED | OUTPATIENT
Start: 2023-06-14 | End: 2023-06-14 | Stop reason: SDUPTHER

## 2023-06-14 RX ORDER — METHOCARBAMOL 750 MG/1
750 TABLET, FILM COATED ORAL 3 TIMES DAILY PRN
Qty: 40 TABLET | Refills: 0 | Status: SHIPPED | OUTPATIENT
Start: 2023-06-14 | End: 2024-03-28
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
## 2023-10-11 DIAGNOSIS — L84 CALLUS OF HEEL: Primary | ICD-10-CM

## 2023-10-11 RX ORDER — UREA 40 %
CREAM (GRAM) TOPICAL 2 TIMES DAILY
Qty: 85 G | Refills: 1 | Status: SHIPPED | OUTPATIENT
Start: 2023-10-11 | End: 2024-03-28

## 2023-10-20 ENCOUNTER — HOSPITAL ENCOUNTER (EMERGENCY)
Facility: HOSPITAL | Age: 33
Discharge: HOME | End: 2023-10-20
Attending: EMERGENCY MEDICINE
Payer: MEDICAID

## 2023-10-20 VITALS
OXYGEN SATURATION: 100 % | BODY MASS INDEX: 40.4 KG/M2 | DIASTOLIC BLOOD PRESSURE: 89 MMHG | TEMPERATURE: 99 F | RESPIRATION RATE: 20 BRPM | HEART RATE: 117 BPM | SYSTOLIC BLOOD PRESSURE: 140 MMHG | WEIGHT: 228 LBS | HEIGHT: 63 IN

## 2023-10-20 DIAGNOSIS — J10.1 INFLUENZA A: Primary | ICD-10-CM

## 2023-10-20 LAB
B-HCG UR QL: NEGATIVE
CTP QC/QA: YES
CTP QC/QA: YES
INFLUENZA A ANTIGEN, POC: POSITIVE
INFLUENZA B ANTIGEN, POC: NEGATIVE
POC RAPID STREP A: NEGATIVE
SARS-COV-2 RDRP RESP QL NAA+PROBE: NEGATIVE

## 2023-10-20 PROCEDURE — 87635 SARS-COV-2 COVID-19 AMP PRB: CPT | Mod: ER | Performed by: EMERGENCY MEDICINE

## 2023-10-20 PROCEDURE — 25000003 PHARM REV CODE 250: Mod: ER | Performed by: EMERGENCY MEDICINE

## 2023-10-20 PROCEDURE — 81025 URINE PREGNANCY TEST: CPT | Mod: ER | Performed by: EMERGENCY MEDICINE

## 2023-10-20 PROCEDURE — 87804 INFLUENZA ASSAY W/OPTIC: CPT | Mod: ER

## 2023-10-20 PROCEDURE — 81025 URINE PREGNANCY TEST: CPT | Mod: ER

## 2023-10-20 PROCEDURE — 99283 EMERGENCY DEPT VISIT LOW MDM: CPT | Mod: 25,ER

## 2023-10-20 RX ORDER — OSELTAMIVIR PHOSPHATE 75 MG/1
75 CAPSULE ORAL 2 TIMES DAILY
Qty: 10 CAPSULE | Refills: 0 | Status: SHIPPED | OUTPATIENT
Start: 2023-10-20 | End: 2023-10-25

## 2023-10-20 RX ORDER — ACETAMINOPHEN 500 MG
1000 TABLET ORAL
Status: COMPLETED | OUTPATIENT
Start: 2023-10-20 | End: 2023-10-20

## 2023-10-20 RX ADMIN — ACETAMINOPHEN 1000 MG: 500 TABLET, FILM COATED ORAL at 02:10

## 2023-10-20 NOTE — Clinical Note
"Kim eCspedes" Gee was seen and treated in our emergency department on 10/20/2023.  She may return to work on 10/26/2023.       If you have any questions or concerns, please don't hesitate to call.      Radha Hendrix MD"

## 2023-10-20 NOTE — ED PROVIDER NOTES
Encounter Date: 10/20/2023    SCRIBE #1 NOTE: I, Gagan Celaya, am scribing for, and in the presence of,  Radha Hendrix MD. I have scribed the following portions of the note - Other sections scribed: HPI, ROS, PE.       History     Chief Complaint   Patient presents with    URI     Chills, headache, cough, body aches, onset over a week     CC: Cough    HPI: Kim Flynn is a 33 y.o. female who presents to the ED for evaluation of cough onset a 4 days ago. Pt complains of associated sneezing, rhinorrhea, chills, sore throat, throbbing headache, and generalized myalgia. Pt denies any aggravating/alleviating factors. Pt endorses taking Mar seltzer, Mucinex, and Flonase for her symptoms. Pt denies nausea, vomiting, diarrhea, or any other associated symptoms. 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         Review of patient's allergies indicates:  No Known Allergies  Past Medical History:   Diagnosis Date    Genital herpes      Past Surgical History:   Procedure Laterality Date    CERVIX SURGERY      biopsy, negative malignancy     SECTION  2016    WISDOM TOOTH EXTRACTION       Family History   Problem Relation Age of Onset    Hyperlipidemia Mother     Diabetes Mother     Sickle cell trait Mother     Diabetes Father     Drug abuse Father     Diabetes Sister     Diabetes Brother     Diabetes Maternal Grandmother     Diabetes Brother     Sickle cell anemia Brother     Hyperlipidemia Daughter     Anemia Daughter     Colon cancer Neg Hx     Breast cancer Neg Hx     Ovarian cancer Neg Hx     Heart attack Neg Hx     Heart disease Neg Hx     Stroke Neg Hx     Osteoporosis Neg Hx      Social History     Tobacco Use    Smoking status: Never    Smokeless tobacco: Never   Substance Use Topics    Alcohol use: No    Drug use: No     Review of Systems   Constitutional:  Positive for chills. Negative for fever.   HENT:  Positive for rhinorrhea, sneezing and sore throat. Negative for  congestion.    Eyes:  Negative for pain.   Respiratory:  Positive for cough. Negative for shortness of breath and wheezing.    Cardiovascular:  Negative for chest pain.   Gastrointestinal:  Negative for abdominal pain, diarrhea, nausea and vomiting.   Genitourinary:  Negative for decreased urine volume, difficulty urinating, dysuria, flank pain, frequency, hematuria, urgency, vaginal bleeding, vaginal discharge and vaginal pain.   Musculoskeletal:  Positive for myalgias.   Skin:  Negative for color change.   Neurological:  Positive for headaches. Negative for weakness.   Hematological:  Does not bruise/bleed easily.   Psychiatric/Behavioral:  Negative for suicidal ideas.    All other systems reviewed and are negative.      Physical Exam     Initial Vitals [10/20/23 1308]   BP Pulse Resp Temp SpO2   (!) 143/91 (!) 122 20 100.1 °F (37.8 °C) 98 %      MAP       --         Physical Exam    Nursing note and vitals reviewed.  Constitutional: She appears well-developed.   HENT:   Head: Normocephalic.   Mouth/Throat: Uvula is midline and mucous membranes are normal. Posterior oropharyngeal erythema present. No oropharyngeal exudate or posterior oropharyngeal edema.   Eyes: Conjunctivae are normal.   Neck:   Normal range of motion.  Cardiovascular:  Regular rhythm.   Tachycardia present.   Exam reveals no decreased pulses.       No murmur heard.  Pulmonary/Chest: No respiratory distress.   Abdominal: Abdomen is soft. She exhibits no distension.   Musculoskeletal:         General: Normal range of motion.      Cervical back: Normal range of motion.     Neurological: She is alert.   Skin: Skin is warm and dry.   Psychiatric: She has a normal mood and affect.         ED Course   Procedures  Labs Reviewed   POCT RAPID INFLUENZA A/B - Abnormal; Notable for the following components:       Result Value    Inflenza A Ag positive (*)     All other components within normal limits   SARS-COV-2 RDRP GENE    Narrative:     This test  utilizes isothermal nucleic acid amplification technology to detect the SARS-CoV-2 RdRp nucleic acid segment. The analytical sensitivity (limit of detection) is 500 copies/swab.     A POSITIVE result is indicative of the presence of SARS-CoV-2 RNA; clinical correlation with patient history and other diagnostic information is necessary to determine patient infection status.    A NEGATIVE result means that SARS-CoV-2 nucleic acids are not present above the limit of detection. A NEGATIVE result should be treated as presumptive. It does not rule out the possibility of COVID-19 and should not be the sole basis for treatment decisions. If COVID-19 is strongly suspected based on clinical and exposure history, re-testing using an alternate molecular assay should be considered.     This test is only for use under the Food and Drug Administration s Emergency Use Authorization (EUA).     Commercial kits are provided by [employer]. Performance characteristics of the EUA have been independently verified by [facility] Department of Pathology and Laboratory Medicine.   _________________________________________________________________   The authorized Fact Sheet for Healthcare Providers and the authorized Fact Sheet for Patients of the ID NOW COVID-19 are available on the FDA website:    https://www.fda.gov/media/117031/download      https://www.fda.gov/media/631067/download      POCT URINE PREGNANCY   POCT INFLUENZA A/B MOLECULAR   POCT STREP A MOLECULAR   POCT STREP A, RAPID          Imaging Results    None          Medications - No data to display  Medical Decision Making  This is an urgent evaluation of a 33-year-old woman who presents to the emergency department today for evaluation of generalized malaise, fever, congestion, nasal discharge.  Differential diagnoses included COVID-19, influenza, viral infection, amongst others.  On physical examination, patient was mildly tachycardic, improved from triage  vitals of a heart rate of 122-105 in the room.  She had postnasal drip and mild erythema to the posterior oropharynx.  She had no exudates and no edema.  Remainder of examination was unremarkable.  Patient's labs returned and she is positive for flu A.  I will discharge her home with Tamiflu and a work note.  I have advised her supportive care and close follow-up with primary care.  Return precautions provided all questions and concerns addressed.    Radha Winchester MD  1:56 PM  10/20/2023       Amount and/or Complexity of Data Reviewed  Labs: ordered.    Risk  Prescription drug management.            Scribe Attestation:   Scribe #1: I performed the above scribed service and the documentation accurately describes the services I performed. I attest to the accuracy of the note.                      Scribe attestation: I, Radha Winchester , personally performed the services described in this documentation.  All medical record entries made by the scribe were at my direction and in my presence.  I have reviewed the chart and agree that the record reflects my personal performance and is accurate and complete.    Clinical Impression:   Final diagnoses:  [J10.1] Influenza A (Primary)        ED Disposition Condition    Discharge Stable          ED Prescriptions       Medication Sig Dispense Start Date End Date Auth. Provider    oseltamivir (TAMIFLU) 75 MG capsule Take 1 capsule (75 mg total) by mouth 2 (two) times daily. for 5 days 10 capsule 10/20/2023 10/25/2023 Radha Winchester MD          Follow-up Information       Follow up With Specialties Details Why Contact Info    Rosa Bonner MD Internal Medicine   [address]  [address]  [facility] [address]  [phone]               Radha Winchester MD  10/20/23 4688

## 2023-10-24 ENCOUNTER — HOSPITAL ENCOUNTER (EMERGENCY)
Facility: HOSPITAL | Age: 33
Discharge: HOME | End: 2023-10-24
Attending: EMERGENCY MEDICINE
Payer: MEDICAID

## 2023-10-24 VITALS
DIASTOLIC BLOOD PRESSURE: 92 MMHG | BODY MASS INDEX: 40.4 KG/M2 | SYSTOLIC BLOOD PRESSURE: 132 MMHG | RESPIRATION RATE: 19 BRPM | OXYGEN SATURATION: 99 % | TEMPERATURE: 98 F | HEIGHT: 63 IN | WEIGHT: 228 LBS | HEART RATE: 87 BPM

## 2023-10-24 DIAGNOSIS — J11.1 INFLUENZA: ICD-10-CM

## 2023-10-24 DIAGNOSIS — R05.9 COUGH: Primary | ICD-10-CM

## 2023-10-24 LAB
B-HCG UR QL: NEGATIVE
CTP QC/QA: YES

## 2023-10-24 PROCEDURE — 99284 EMERGENCY DEPT VISIT MOD MDM: CPT | Mod: 25,ER

## 2023-10-24 PROCEDURE — 63600175 PHARM REV CODE 636 W HCPCS: Mod: ER | Performed by: NURSE PRACTITIONER

## 2023-10-24 PROCEDURE — 96372 THER/PROPH/DIAG INJ SC/IM: CPT | Performed by: NURSE PRACTITIONER

## 2023-10-24 PROCEDURE — 81025 URINE PREGNANCY TEST: CPT | Mod: ER | Performed by: EMERGENCY MEDICINE

## 2023-10-24 PROCEDURE — 81025 URINE PREGNANCY TEST: CPT | Mod: ER

## 2023-10-24 RX ORDER — BENZONATATE 100 MG/1
100 CAPSULE ORAL 3 TIMES DAILY PRN
Qty: 20 CAPSULE | Refills: 0 | Status: SHIPPED | OUTPATIENT
Start: 2023-10-24 | End: 2024-03-28

## 2023-10-24 RX ORDER — ALBUTEROL SULFATE 90 UG/1
1-2 AEROSOL, METERED RESPIRATORY (INHALATION) EVERY 4 HOURS PRN
Qty: 8 G | Refills: 0 | Status: SHIPPED | OUTPATIENT
Start: 2023-10-24 | End: 2024-10-23

## 2023-10-24 RX ORDER — DEXAMETHASONE SODIUM PHOSPHATE 4 MG/ML
8 INJECTION, SOLUTION INTRA-ARTICULAR; INTRALESIONAL; INTRAMUSCULAR; INTRAVENOUS; SOFT TISSUE
Status: COMPLETED | OUTPATIENT
Start: 2023-10-24 | End: 2023-10-24

## 2023-10-24 RX ADMIN — DEXAMETHASONE SODIUM PHOSPHATE 8 MG: 4 INJECTION, SOLUTION INTRA-ARTICULAR; INTRALESIONAL; INTRAMUSCULAR; INTRAVENOUS; SOFT TISSUE at 09:10

## 2023-10-24 NOTE — DISCHARGE INSTRUCTIONS

## 2023-10-24 NOTE — ED PROVIDER NOTES
Encounter Date: 10/24/2023    SCRIBE #1 NOTE: I, Apollo Moore, am scribing for, and in the presence of,  Magan Rhodes NP. I have scribed the following portions of the note - Other sections scribed: HPI,ROS.       History     Chief Complaint   Patient presents with    Cough     Patient presents w/ a c/o of cough since Friday. Was diagnosed w/ the FLU on 10/20. Has had a persistent cough since then, but was prescribed no cough medication. Unable to get an appointment w/ her PCP. GCS 15.     Kim Flynn is a 33 y.o. female who presents to the ED for evaluation of a worsening cough onset 4 days ago. Patient states she was recently diagnosed with the flu. She was given tamiflu and states since she has been taking it that the symptom has worsened. She has endorsed a headache and chest pain from the coughing. She has taken ibuprofen to alleviate the symptoms. Denies fever.    The history is provided by the patient. No  was used.     Review of patient's allergies indicates:  No Known Allergies  Past Medical History:   Diagnosis Date    Genital herpes      Past Surgical History:   Procedure Laterality Date    CERVIX SURGERY      biopsy, negative malignancy     SECTION  2016    WISDOM TOOTH EXTRACTION       Family History   Problem Relation Age of Onset    Hyperlipidemia Mother     Diabetes Mother     Sickle cell trait Mother     Diabetes Father     Drug abuse Father     Diabetes Sister     Diabetes Brother     Diabetes Maternal Grandmother     Diabetes Brother     Sickle cell anemia Brother     Hyperlipidemia Daughter     Anemia Daughter     Colon cancer Neg Hx     Breast cancer Neg Hx     Ovarian cancer Neg Hx     Heart attack Neg Hx     Heart disease Neg Hx     Stroke Neg Hx     Osteoporosis Neg Hx      Social History     Tobacco Use    Smoking status: Never    Smokeless tobacco: Never   Substance Use Topics    Alcohol use: No    Drug use: No     Review of Systems   Constitutional:   Negative for fever.   HENT:  Negative for congestion, sore throat and trouble swallowing.    Respiratory:  Positive for cough. Negative for shortness of breath.    Cardiovascular:  Negative for chest pain.   Gastrointestinal:  Negative for abdominal pain, constipation, diarrhea, nausea and vomiting.   Genitourinary:  Negative for dysuria, flank pain, frequency and urgency.   Musculoskeletal:  Negative for back pain.   Skin:  Negative for rash.   Neurological:  Negative for headaches.       Physical Exam     Initial Vitals [10/24/23 0816]   BP Pulse Resp Temp SpO2   (!) 136/95 93 20 98.3 °F (36.8 °C) 97 %      MAP       --         Physical Exam    Nursing note and vitals reviewed.  Constitutional: She appears well-developed and well-nourished. She is not diaphoretic. No distress.   HENT:   Head: Normocephalic and atraumatic.   Right Ear: External ear normal.   Left Ear: External ear normal.   Nose: Nose normal.   Eyes: Conjunctivae and EOM are normal. Right eye exhibits no discharge. Left eye exhibits no discharge.   Neck: Neck supple. No tracheal deviation present.   Normal range of motion.  Cardiovascular:  Normal rate.           Pulmonary/Chest: Effort normal and breath sounds normal. No accessory muscle usage or stridor. No tachypnea. No respiratory distress.   Abdominal: Abdomen is soft. She exhibits no distension. There is no abdominal tenderness.   Musculoskeletal:         General: No tenderness. Normal range of motion.      Cervical back: Normal range of motion and neck supple.     Neurological: She is alert and oriented to person, place, and time. She has normal strength. No cranial nerve deficit or sensory deficit.   Skin: Skin is warm and dry.   Psychiatric: She has a normal mood and affect. Her behavior is normal. Judgment and thought content normal.         ED Course   Procedures  Labs Reviewed   POCT URINE PREGNANCY          Imaging Results              X-Ray Chest PA And Lateral (Final result)  Result  "time 10/24/23 09:08:46      Final result by Randy Philippe MD (10/24/23 09:08:46)                   Impression:      Peribronchial cuffing and coarsened perihilar interstitial lung markings potentially related to a nonspecific pneumonitis or small airways disease in this patient with reported influenza.  No confluent area of consolidation.      Electronically signed by: Randy Philippe MD  Date:    10/24/2023  Time:    09:08               Narrative:    EXAMINATION:  XR CHEST PA AND LATERAL    CLINICAL HISTORY:  Provided history is "  Cough, unspecified".    TECHNIQUE:  Frontal and lateral views of the chest were performed.    COMPARISON:  10/14/2021.    FINDINGS:  Cardiac silhouette is not enlarged. Coarsened perihilar interstitial lung markings and peribronchial cuffing, potentially related to a nonspecific pneumonitis or small airways disease.  No confluent area of consolidation.  No sizable pleural effusion.  No pneumothorax.                                       Medications   dexAMETHasone injection 8 mg (8 mg Intramuscular Given 10/24/23 0926)     Medical Decision Making  DDx:  Influenza, viral syndrome, COVID, strep pharyngitis, viral pharyngitis, otitis media, sinusitis, pneumonia, bronchitis, meningitis, sepsis, others    HPI and physical exam as above.      The patient appears to have a viral upper respiratory infection.  Based upon the history and physical exam the patient does not appear to have a serious bacterial infection such as pneumonia, sepsis, otitis media, bacterial sinusitis, strep pharyngitis, parapharyngeal or peritonsillar abscess, meningitis. Respiratory effort is normal with no signs of increased work of breathing or respiratory distress. Lungs are clear to auscultation bilaterally in all fields. Mucous membranes are moist and the patient is tolerating P.O. without difficulty.  Patient is afebrile.  Patient is nontoxic, alert, active, and appears very well at this time just prior to " discharge. I have given specific return precautions to the patient.  I will prescribe medications to treat the patient's symptoms.     The results and physical exam findings were reviewed with the patient. Advised them to follow up with his PCP for re-evaluation and further management.  ED return precautions given. All questions regarding diagnosis and plan were answered to the patient's fullest possible satisfaction. Patient expressed understanding of diagnosis, discharge instructions, and return precautions.    Amount and/or Complexity of Data Reviewed  Labs: ordered.  Radiology: ordered.    Risk  Prescription drug management.            Scribe Attestation:   Scribe #1: I performed the above scribed service and the documentation accurately describes the services I performed. I attest to the accuracy of the note.              I, Magan Rhodes NP, personally performed the services described in this documentation.  All medical record entries made by the scribe were at my direction and in my presence.  I have reviewed the chart and agree that the record reflects my personal performance and is accurate and complete.            Clinical Impression:   Final diagnoses:  [R05.9] Cough (Primary)  [J11.1] Influenza        ED Disposition Condition    Discharge Stable          ED Prescriptions       Medication Sig Dispense Start Date End Date Auth. Provider    albuterol (PROVENTIL/VENTOLIN HFA) 90 mcg/actuation inhaler Inhale 1-2 puffs into the lungs every 4 (four) hours as needed for Wheezing or Shortness of Breath. Rescue 8 g 10/24/2023 10/23/2024 Magan Rhodes, JYOTI    benzonatate (TESSALON) 100 MG capsule Take 1 capsule (100 mg total) by mouth 3 (three) times daily as needed for Cough. 20 capsule 10/24/2023 -- Magan Rhodes NP          Follow-up Information       Follow up With Specialties Details Why Contact Info    Rosa Bonner MD Internal Medicine Schedule an appointment as soon as possible for a visit in 1  week For further evaluation [address]  [address]  [facility] [address]  [phone]      [facility] ED Emergency Medicine Go to  If symptoms worsen, As needed [address] [facility] [address] [phone]             Magan Rhodes, NP  10/24/23 7023

## 2024-02-15 ENCOUNTER — LAB VISIT (OUTPATIENT)
Dept: LAB | Facility: OTHER | Age: 34
End: 2024-02-15
Attending: STUDENT IN AN ORGANIZED HEALTH CARE EDUCATION/TRAINING PROGRAM
Payer: MEDICAID

## 2024-02-15 DIAGNOSIS — Z11.59 ENCOUNTER FOR HEPATITIS C SCREENING TEST FOR LOW RISK PATIENT: ICD-10-CM

## 2024-02-15 DIAGNOSIS — Z20.2 EXPOSURE TO SEXUALLY TRANSMITTED DISEASE (STD): Primary | ICD-10-CM

## 2024-02-15 DIAGNOSIS — Z11.4 SCREENING FOR HIV WITHOUT PRESENCE OF RISK FACTORS: ICD-10-CM

## 2024-02-15 DIAGNOSIS — Z20.2 EXPOSURE TO SEXUALLY TRANSMITTED DISEASE (STD): ICD-10-CM

## 2024-02-15 PROCEDURE — 87491 CHLMYD TRACH DNA AMP PROBE: CPT | Performed by: STUDENT IN AN ORGANIZED HEALTH CARE EDUCATION/TRAINING PROGRAM

## 2024-02-15 PROCEDURE — 87389 HIV-1 AG W/HIV-1&-2 AB AG IA: CPT | Performed by: STUDENT IN AN ORGANIZED HEALTH CARE EDUCATION/TRAINING PROGRAM

## 2024-02-15 PROCEDURE — 86803 HEPATITIS C AB TEST: CPT | Performed by: STUDENT IN AN ORGANIZED HEALTH CARE EDUCATION/TRAINING PROGRAM

## 2024-02-15 PROCEDURE — 87340 HEPATITIS B SURFACE AG IA: CPT | Performed by: STUDENT IN AN ORGANIZED HEALTH CARE EDUCATION/TRAINING PROGRAM

## 2024-02-15 PROCEDURE — 86592 SYPHILIS TEST NON-TREP QUAL: CPT | Performed by: STUDENT IN AN ORGANIZED HEALTH CARE EDUCATION/TRAINING PROGRAM

## 2024-02-15 PROCEDURE — 36415 COLL VENOUS BLD VENIPUNCTURE: CPT | Performed by: STUDENT IN AN ORGANIZED HEALTH CARE EDUCATION/TRAINING PROGRAM

## 2024-02-16 LAB
C TRACH DNA SPEC QL NAA+PROBE: NOT DETECTED
N GONORRHOEA DNA SPEC QL NAA+PROBE: NOT DETECTED
RPR SER QL: NORMAL

## 2024-03-28 ENCOUNTER — OFFICE VISIT (OUTPATIENT)
Dept: INTERNAL MEDICINE | Facility: CLINIC | Age: 34
End: 2024-03-28
Payer: MEDICAID

## 2024-03-28 ENCOUNTER — LAB VISIT (OUTPATIENT)
Dept: LAB | Facility: OTHER | Age: 34
End: 2024-03-28
Attending: STUDENT IN AN ORGANIZED HEALTH CARE EDUCATION/TRAINING PROGRAM
Payer: MEDICAID

## 2024-03-28 ENCOUNTER — TELEPHONE (OUTPATIENT)
Dept: INTERNAL MEDICINE | Facility: CLINIC | Age: 34
End: 2024-03-28
Payer: MEDICAID

## 2024-03-28 VITALS
HEIGHT: 63 IN | SYSTOLIC BLOOD PRESSURE: 128 MMHG | WEIGHT: 231.69 LBS | BODY MASS INDEX: 41.05 KG/M2 | DIASTOLIC BLOOD PRESSURE: 78 MMHG

## 2024-03-28 DIAGNOSIS — R73.03 PREDIABETES: ICD-10-CM

## 2024-03-28 DIAGNOSIS — R03.0 ELEVATED BP WITHOUT DIAGNOSIS OF HYPERTENSION: ICD-10-CM

## 2024-03-28 DIAGNOSIS — E55.9 VITAMIN D DEFICIENCY: ICD-10-CM

## 2024-03-28 DIAGNOSIS — Z13.6 SCREENING FOR CARDIOVASCULAR CONDITION: ICD-10-CM

## 2024-03-28 DIAGNOSIS — Z00.00 HEALTH MAINTENANCE EXAMINATION: ICD-10-CM

## 2024-03-28 DIAGNOSIS — E66.01 CLASS 3 SEVERE OBESITY DUE TO EXCESS CALORIES WITH SERIOUS COMORBIDITY AND BODY MASS INDEX (BMI) OF 40.0 TO 44.9 IN ADULT: ICD-10-CM

## 2024-03-28 DIAGNOSIS — B00.9 HSV-2 INFECTION: ICD-10-CM

## 2024-03-28 DIAGNOSIS — E88.810 METABOLIC SYNDROME: ICD-10-CM

## 2024-03-28 DIAGNOSIS — Z00.00 HEALTH MAINTENANCE EXAMINATION: Primary | ICD-10-CM

## 2024-03-28 DIAGNOSIS — Z71.3 ENCOUNTER FOR WEIGHT LOSS COUNSELING: ICD-10-CM

## 2024-03-28 DIAGNOSIS — F41.1 GENERALIZED ANXIETY DISORDER: ICD-10-CM

## 2024-03-28 LAB
ALBUMIN SERPL BCP-MCNC: 4.2 G/DL (ref 3.5–5.2)
ALP SERPL-CCNC: 84 U/L (ref 55–135)
ALT SERPL W/O P-5'-P-CCNC: 25 U/L (ref 10–44)
ANION GAP SERPL CALC-SCNC: 9 MMOL/L (ref 8–16)
AST SERPL-CCNC: 18 U/L (ref 10–40)
BASOPHILS # BLD AUTO: 0.05 K/UL (ref 0–0.2)
BASOPHILS NFR BLD: 1 % (ref 0–1.9)
BILIRUB SERPL-MCNC: 1.1 MG/DL (ref 0.1–1)
BUN SERPL-MCNC: 7 MG/DL (ref 6–20)
CALCIUM SERPL-MCNC: 9.4 MG/DL (ref 8.7–10.5)
CHLORIDE SERPL-SCNC: 105 MMOL/L (ref 95–110)
CO2 SERPL-SCNC: 26 MMOL/L (ref 23–29)
CREAT SERPL-MCNC: 0.7 MG/DL (ref 0.5–1.4)
DIFFERENTIAL METHOD BLD: NORMAL
EOSINOPHIL # BLD AUTO: 0.1 K/UL (ref 0–0.5)
EOSINOPHIL NFR BLD: 2 % (ref 0–8)
ERYTHROCYTE [DISTWIDTH] IN BLOOD BY AUTOMATED COUNT: 13.4 % (ref 11.5–14.5)
EST. GFR  (NO RACE VARIABLE): >60 ML/MIN/1.73 M^2
GLUCOSE SERPL-MCNC: 91 MG/DL (ref 70–110)
HCT VFR BLD AUTO: 40.7 % (ref 37–48.5)
HGB BLD-MCNC: 13.5 G/DL (ref 12–16)
IMM GRANULOCYTES # BLD AUTO: 0 K/UL (ref 0–0.04)
IMM GRANULOCYTES NFR BLD AUTO: 0 % (ref 0–0.5)
LYMPHOCYTES # BLD AUTO: 2 K/UL (ref 1–4.8)
LYMPHOCYTES NFR BLD: 41 % (ref 18–48)
MCH RBC QN AUTO: 27.4 PG (ref 27–31)
MCHC RBC AUTO-ENTMCNC: 33.2 G/DL (ref 32–36)
MCV RBC AUTO: 83 FL (ref 82–98)
MONOCYTES # BLD AUTO: 0.3 K/UL (ref 0.3–1)
MONOCYTES NFR BLD: 5.9 % (ref 4–15)
NEUTROPHILS # BLD AUTO: 2.5 K/UL (ref 1.8–7.7)
NEUTROPHILS NFR BLD: 50.1 % (ref 38–73)
NRBC BLD-RTO: 0 /100 WBC
PLATELET # BLD AUTO: 295 K/UL (ref 150–450)
PMV BLD AUTO: 10.1 FL (ref 9.2–12.9)
POTASSIUM SERPL-SCNC: 3.2 MMOL/L (ref 3.5–5.1)
PROT SERPL-MCNC: 7.5 G/DL (ref 6–8.4)
RBC # BLD AUTO: 4.93 M/UL (ref 4–5.4)
SODIUM SERPL-SCNC: 140 MMOL/L (ref 136–145)
TSH SERPL DL<=0.005 MIU/L-ACNC: 0.95 UIU/ML (ref 0.4–4)
WBC # BLD AUTO: 4.9 K/UL (ref 3.9–12.7)

## 2024-03-28 PROCEDURE — 3078F DIAST BP <80 MM HG: CPT | Mod: CPTII,,, | Performed by: STUDENT IN AN ORGANIZED HEALTH CARE EDUCATION/TRAINING PROGRAM

## 2024-03-28 PROCEDURE — 80061 LIPID PANEL: CPT | Performed by: STUDENT IN AN ORGANIZED HEALTH CARE EDUCATION/TRAINING PROGRAM

## 2024-03-28 PROCEDURE — 84443 ASSAY THYROID STIM HORMONE: CPT | Performed by: STUDENT IN AN ORGANIZED HEALTH CARE EDUCATION/TRAINING PROGRAM

## 2024-03-28 PROCEDURE — 99999PBSHW PR PBB SHADOW TECHNICAL ONLY FILED TO HB: Mod: PBBFAC,,,

## 2024-03-28 PROCEDURE — 83036 HEMOGLOBIN GLYCOSYLATED A1C: CPT | Performed by: STUDENT IN AN ORGANIZED HEALTH CARE EDUCATION/TRAINING PROGRAM

## 2024-03-28 PROCEDURE — 99395 PREV VISIT EST AGE 18-39: CPT | Mod: S$PBB,,, | Performed by: STUDENT IN AN ORGANIZED HEALTH CARE EDUCATION/TRAINING PROGRAM

## 2024-03-28 PROCEDURE — 99213 OFFICE O/P EST LOW 20 MIN: CPT | Mod: PBBFAC | Performed by: STUDENT IN AN ORGANIZED HEALTH CARE EDUCATION/TRAINING PROGRAM

## 2024-03-28 PROCEDURE — 85025 COMPLETE CBC W/AUTO DIFF WBC: CPT | Performed by: STUDENT IN AN ORGANIZED HEALTH CARE EDUCATION/TRAINING PROGRAM

## 2024-03-28 PROCEDURE — 3074F SYST BP LT 130 MM HG: CPT | Mod: CPTII,,, | Performed by: STUDENT IN AN ORGANIZED HEALTH CARE EDUCATION/TRAINING PROGRAM

## 2024-03-28 PROCEDURE — 82306 VITAMIN D 25 HYDROXY: CPT | Performed by: STUDENT IN AN ORGANIZED HEALTH CARE EDUCATION/TRAINING PROGRAM

## 2024-03-28 PROCEDURE — 3008F BODY MASS INDEX DOCD: CPT | Mod: CPTII,,, | Performed by: STUDENT IN AN ORGANIZED HEALTH CARE EDUCATION/TRAINING PROGRAM

## 2024-03-28 PROCEDURE — 97802 MEDICAL NUTRITION INDIV IN: CPT | Mod: PBBFAC | Performed by: STUDENT IN AN ORGANIZED HEALTH CARE EDUCATION/TRAINING PROGRAM

## 2024-03-28 PROCEDURE — 3044F HG A1C LEVEL LT 7.0%: CPT | Mod: CPTII,,, | Performed by: STUDENT IN AN ORGANIZED HEALTH CARE EDUCATION/TRAINING PROGRAM

## 2024-03-28 PROCEDURE — 80053 COMPREHEN METABOLIC PANEL: CPT | Performed by: STUDENT IN AN ORGANIZED HEALTH CARE EDUCATION/TRAINING PROGRAM

## 2024-03-28 PROCEDURE — 36415 COLL VENOUS BLD VENIPUNCTURE: CPT | Performed by: STUDENT IN AN ORGANIZED HEALTH CARE EDUCATION/TRAINING PROGRAM

## 2024-03-28 PROCEDURE — 99999 PR PBB SHADOW E&M-EST. PATIENT-LVL III: CPT | Mod: PBBFAC,,, | Performed by: STUDENT IN AN ORGANIZED HEALTH CARE EDUCATION/TRAINING PROGRAM

## 2024-03-28 NOTE — PROGRESS NOTES
Subjective:       Patient ID: Kim Flynn is a 33 y.o. female.    Chief Complaint: Health maintenance examination [Z00.00]    Patient is established with me, here today for the following:     Health maintenance -   Denies family history of colorectal cancer.  Denies family history of breast cancer.  Denies family history of ovarian cancer.  Last pap performed .   History of abnormal pap smear.  Previously LSIL and HPV positive.  Denies family history of osteoporosis.  Denies significant family history of cardiac disease.  UTD on influenza, Tdap, COVID primary, HPV vaccinations.  Due for COVID vaccinations.  Never smoker.  Drinks alcohol 1 times monthly, 1-5 drinks per sitting.  Denies drug use.  Completed HIV and hepatitis C screening.  Due for lipid screening.  Lab Results       Component                Value               Date                       LDLCALC                  87.0                2023                          Has regular menstrual cycles monthly.  Menstruation lasts for 6-7 days.  Denies menorrhagia or requiring more than 5 pads or tampons in a single day.  Began menarche at age 10.   [id number]  Endorses gestational diabetes.   Endorses preeclampsia.   Not currently sexually active, male partners.  Not currently using contraception.    Prediabetes -   Due for diabetes screening.  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   Making trail mix bags as snacks  Trying to increase salad intake  Using quite a bit of salad dressing  BMR: 1,727 kcal/day  Goal calories per day: 1300 - 1500 kcal/day    Exercise:  Not routinely exercising  Sedentary lifestyle   Target HR: 161 bpm     Has previously tried Adipex for weight reduction, experienced side effects.  Denies personal history of pancreatitis   Dana family history of thyroid cancer or MEN syndrome  No prior weight reduction surgeries    Wt Readings from Last 5 Encounters:  03/28/24 : 105.1 kg (231 lb 11.3 oz)  10/24/23 : 103.4 kg (228 lb)  10/20/23 : 103.4 kg (228 lb)  06/14/23 : 103.9 kg (229 lb)  04/11/23 : 103.9 kg (229 lb)    Lab Results       Component                Value               Date                       HGBA1C                   5.9 (H)             03/22/2023                 HGBA1C                   5.5                 02/10/2022            Lab Results       Component                Value               Date                       HDL                      37 (L)              03/22/2023            Lab Results       Component                Value               Date                       TRIG                     90                  03/22/2023                 Metabolic syndrome:   Waist circumference   HDL cholesterol <50 mg/dL  Prediabetes  Elevated BP     Elevated BP without diagnosis of HTN -   Never previously diagnosed with HTN  Never previously treated with medication for HTN  Patient not currently checking BP at home.   Denies headaches, vision changes, CP, palpitations, or other concerning symptoms.  BP Readings from Last 5 Encounters:  03/28/24 : (!) 130/92  10/24/23 : (!) 132/92  10/20/23 : (!) 140/89  06/14/23 : (!) 122/90  04/11/23 : (!) 162/98          Review of Systems   Constitutional:  Positive for unexpected weight change. Negative for activity change, fatigue and fever.   Respiratory:  Negative for cough and  "shortness of breath.    Cardiovascular:  Negative for chest pain and palpitations.   Gastrointestinal:  Negative for abdominal pain, constipation, diarrhea, nausea and vomiting.   Neurological:  Negative for syncope and headaches.   Psychiatric/Behavioral:  Positive for dysphoric mood. The patient is nervous/anxious.          Current Outpatient Medications   Medication Instructions    albuterol (PROVENTIL/VENTOLIN HFA) 90 mcg/actuation inhaler 1-2 puffs, Inhalation, Every 4 hours PRN, Rescue    benzonatate (TESSALON) 100 mg, Oral, 3 times daily PRN    cetirizine (ZYRTEC) 10 MG tablet No dose, route, or frequency recorded.    fexofenadine (ALLEGRA) 60 mg, Oral, Daily    loratadine (CLARITIN) 10 mg, Oral, Daily    methocarbamoL (ROBAXIN) 750 mg, Oral, 3 times daily PRN    urea (CARMOL) 40 % Crea Topical (Top), 2 times daily    valACYclovir (VALTREX) 500 mg, Oral, Daily     Objective:      Vitals:    03/28/24 1136 03/28/24 1217   BP: (!) 130/92 128/78   Weight: 105.1 kg (231 lb 11.3 oz)    Height: 5' 3" (1.6 m)    PainSc: 0-No pain      Body mass index is 41.04 kg/m².    Physical Exam  Vitals reviewed.   Constitutional:       General: She is not in acute distress.     Appearance: Normal appearance. She is not ill-appearing or diaphoretic.   HENT:      Head: Normocephalic and atraumatic.      Right Ear: Tympanic membrane, ear canal and external ear normal. There is no impacted cerumen.      Left Ear: Tympanic membrane, ear canal and external ear normal. There is no impacted cerumen.      Nose: Nose normal. No rhinorrhea.      Mouth/Throat:      Mouth: Mucous membranes are moist.      Pharynx: Oropharynx is clear. No oropharyngeal exudate or posterior oropharyngeal erythema.   Eyes:      General: No scleral icterus.        Right eye: No discharge.         Left eye: No discharge.      Conjunctiva/sclera: Conjunctivae normal.   Neck:      Thyroid: No thyromegaly or thyroid tenderness.      Trachea: Trachea normal. "   Cardiovascular:      Rate and Rhythm: Normal rate and regular rhythm.      Heart sounds: Normal heart sounds. No murmur heard.     No friction rub. No gallop.   Pulmonary:      Effort: Pulmonary effort is normal. No respiratory distress.      Breath sounds: Normal breath sounds. No stridor. No wheezing, rhonchi or rales.   Abdominal:      General: There is no distension.      Palpations: Abdomen is soft.      Tenderness: There is no abdominal tenderness. There is no guarding or rebound.   Musculoskeletal:         General: No swelling or deformity.      Cervical back: Neck supple.   Lymphadenopathy:      Head:      Right side of head: No submandibular or posterior auricular adenopathy.      Left side of head: No submandibular or posterior auricular adenopathy.      Cervical: No cervical adenopathy.      Right cervical: No superficial, deep or posterior cervical adenopathy.     Left cervical: No superficial, deep or posterior cervical adenopathy.      Upper Body:      Right upper body: No supraclavicular adenopathy.      Left upper body: No supraclavicular adenopathy.   Skin:     General: Skin is warm and dry.   Neurological:      General: No focal deficit present.      Mental Status: She is alert. Mental status is at baseline.      Gait: Gait normal.   Psychiatric:         Mood and Affect: Mood normal.         Behavior: Behavior normal.         Assessment:       1. Health maintenance examination    2. Prediabetes    3. HSV-2 infection    4. Generalized anxiety disorder    5. Encounter for weight loss counseling    6. Class 3 severe obesity due to excess calories with serious comorbidity and body mass index (BMI) of 40.0 to 44.9 in adult    7. Metabolic syndrome    8. Elevated BP without diagnosis of hypertension    9. Vitamin D deficiency    10. Screening for cardiovascular condition        Plan:       Prediabetes  Continue healthy diet and lifestyle modifications  RTC in 3 months for follow up.  -     Hemoglobin  A1C; Future    HSV-2 infection  Continue current medications.  RTC in 3 months for follow up.    Generalized anxiety disorder  Continue counseling  Discussed starting medication, will consider medication and advise  RTC in 3 months for follow up.    Encounter for weight loss counseling  Class 3 severe obesity due to excess calories with serious comorbidity and body mass index (BMI) of 40.0 to 44.9 in adult  Metabolic syndrome  Insurance not covering medication for weight reduction  Recommend the following:      Goal of at least 150 minutes aerobic exercise weekly. Recommend 30 mins, 5 days weekly.      Target heart rate while performing aerobic activity 161 bpm.       Monitoring caloric intake for 1-2 days weekly, with a goal of 1300 - 1500 kcal/day.       Organizing plate with half vegetables, quarter whole grain starches or starchy vegetables, and quarter lean protein.       Goal of at least 1 vegetarian meal weekly and emphasizing fresh vegetables and fruits in diet.       Weight loss goal of 1-2 lbs per week. Goal by next appt in 12 weeks is 10 lbs.        Increasing healthy based fats such as avocados, olive oil, nuts, and freshwater, cold-water fish.      Decreasing red meat to no more than once weekly.       RTC in 3 months for weight management follow up.     Elevated BP without diagnosis of hypertension  Check BP at home 3-4 times weekly, keep log for review.   RTC in 3 months for follow up.    Vitamin D deficiency  -     Vitamin D; Future    Health maintenance examination  Reviewed and discussed age appropriate screenings and immunizations.  -     Comprehensive Metabolic Panel; Future  -     TSH; Future  -     Lipid Panel; Future  -     Hemoglobin A1C; Future  -     CBC Auto Differential; Future  -     Vitamin D; Future    Screening for cardiovascular condition  -     Lipid Panel; Future      Rosa Bonner MD  3/28/2024

## 2024-03-29 LAB
25(OH)D3+25(OH)D2 SERPL-MCNC: 6 NG/ML (ref 30–96)
CHOLEST SERPL-MCNC: 157 MG/DL (ref 120–199)
CHOLEST/HDLC SERPL: 4.4 {RATIO} (ref 2–5)
ESTIMATED AVG GLUCOSE: 137 MG/DL (ref 68–131)
HBA1C MFR BLD: 6.4 % (ref 4–5.6)
HDLC SERPL-MCNC: 36 MG/DL (ref 40–75)
HDLC SERPL: 22.9 % (ref 20–50)
LDLC SERPL CALC-MCNC: 98 MG/DL (ref 63–159)
NONHDLC SERPL-MCNC: 121 MG/DL
TRIGL SERPL-MCNC: 115 MG/DL (ref 30–150)

## 2024-04-24 DIAGNOSIS — Z71.3 ENCOUNTER FOR WEIGHT LOSS COUNSELING: ICD-10-CM

## 2024-04-24 DIAGNOSIS — E66.01 CLASS 3 SEVERE OBESITY DUE TO EXCESS CALORIES WITH SERIOUS COMORBIDITY AND BODY MASS INDEX (BMI) OF 40.0 TO 44.9 IN ADULT: ICD-10-CM

## 2024-04-24 DIAGNOSIS — R73.03 PREDIABETES: ICD-10-CM

## 2024-04-24 DIAGNOSIS — F41.1 GENERALIZED ANXIETY DISORDER: Primary | ICD-10-CM

## 2024-04-24 RX ORDER — SEMAGLUTIDE 0.25 MG/.5ML
0.25 INJECTION, SOLUTION SUBCUTANEOUS
Qty: 2 ML | Refills: 0 | Status: SHIPPED | OUTPATIENT
Start: 2024-04-24

## 2024-04-24 RX ORDER — SERTRALINE HYDROCHLORIDE 50 MG/1
TABLET, FILM COATED ORAL
Qty: 90 TABLET | Refills: 0 | Status: SHIPPED | OUTPATIENT
Start: 2024-04-24 | End: 2024-07-24

## 2024-06-02 ENCOUNTER — HOSPITAL ENCOUNTER (EMERGENCY)
Facility: HOSPITAL | Age: 34
Discharge: HOME | End: 2024-06-02
Attending: EMERGENCY MEDICINE
Payer: COMMERCIAL

## 2024-06-02 VITALS
RESPIRATION RATE: 16 BRPM | HEIGHT: 63 IN | TEMPERATURE: 99 F | SYSTOLIC BLOOD PRESSURE: 123 MMHG | DIASTOLIC BLOOD PRESSURE: 84 MMHG | HEART RATE: 98 BPM | OXYGEN SATURATION: 98 % | BODY MASS INDEX: 40.22 KG/M2 | WEIGHT: 227 LBS

## 2024-06-02 DIAGNOSIS — J02.0 ACUTE STREPTOCOCCAL PHARYNGITIS: Primary | ICD-10-CM

## 2024-06-02 LAB
B-HCG UR QL: NEGATIVE
CTP QC/QA: YES
CTP QC/QA: YES
INFLUENZA A ANTIGEN, POC: NEGATIVE
INFLUENZA B ANTIGEN, POC: NEGATIVE
POC RAPID STREP A: POSITIVE
SARS-COV-2 RDRP RESP QL NAA+PROBE: NEGATIVE

## 2024-06-02 PROCEDURE — 63600175 PHARM REV CODE 636 W HCPCS: Mod: ER | Performed by: EMERGENCY MEDICINE

## 2024-06-02 PROCEDURE — 81025 URINE PREGNANCY TEST: CPT | Mod: ER | Performed by: EMERGENCY MEDICINE

## 2024-06-02 PROCEDURE — 96374 THER/PROPH/DIAG INJ IV PUSH: CPT | Mod: ER

## 2024-06-02 PROCEDURE — 96372 THER/PROPH/DIAG INJ SC/IM: CPT | Performed by: EMERGENCY MEDICINE

## 2024-06-02 PROCEDURE — 87635 SARS-COV-2 COVID-19 AMP PRB: CPT | Mod: ER | Performed by: EMERGENCY MEDICINE

## 2024-06-02 PROCEDURE — 99284 EMERGENCY DEPT VISIT MOD MDM: CPT | Mod: 25,ER

## 2024-06-02 PROCEDURE — 25000003 PHARM REV CODE 250: Mod: ER | Performed by: EMERGENCY MEDICINE

## 2024-06-02 RX ORDER — IBUPROFEN 600 MG/1
600 TABLET ORAL EVERY 6 HOURS PRN
Qty: 20 TABLET | Refills: 0 | Status: SHIPPED | OUTPATIENT
Start: 2024-06-02

## 2024-06-02 RX ORDER — ACETAMINOPHEN 500 MG
500 TABLET ORAL EVERY 6 HOURS PRN
Qty: 30 TABLET | Refills: 0 | Status: SHIPPED | OUTPATIENT
Start: 2024-06-02

## 2024-06-02 RX ORDER — FLUTICASONE PROPIONATE 50 MCG
1 SPRAY, SUSPENSION (ML) NASAL 2 TIMES DAILY
Qty: 16 G | Refills: 0 | Status: SHIPPED | OUTPATIENT
Start: 2024-06-02

## 2024-06-02 RX ORDER — LORATADINE 10 MG/1
10 TABLET ORAL DAILY
Qty: 60 TABLET | Refills: 0 | Status: SHIPPED | OUTPATIENT
Start: 2024-06-02 | End: 2025-06-02

## 2024-06-02 RX ORDER — IBUPROFEN 600 MG/1
600 TABLET ORAL
Status: COMPLETED | OUTPATIENT
Start: 2024-06-02 | End: 2024-06-02

## 2024-06-02 RX ORDER — DEXAMETHASONE SODIUM PHOSPHATE 4 MG/ML
12 INJECTION, SOLUTION INTRA-ARTICULAR; INTRALESIONAL; INTRAMUSCULAR; INTRAVENOUS; SOFT TISSUE
Status: COMPLETED | OUTPATIENT
Start: 2024-06-02 | End: 2024-06-02

## 2024-06-02 RX ORDER — AMOXICILLIN AND CLAVULANATE POTASSIUM 875; 125 MG/1; MG/1
1 TABLET, FILM COATED ORAL 2 TIMES DAILY
Qty: 20 TABLET | Refills: 0 | Status: SHIPPED | OUTPATIENT
Start: 2024-06-02 | End: 2024-06-12

## 2024-06-02 RX ADMIN — IBUPROFEN 600 MG: 600 TABLET ORAL at 11:06

## 2024-06-02 RX ADMIN — DEXAMETHASONE SODIUM PHOSPHATE 12 MG: 4 INJECTION INTRA-ARTICULAR; INTRALESIONAL; INTRAMUSCULAR; INTRAVENOUS; SOFT TISSUE at 11:06

## 2024-06-02 NOTE — Clinical Note
"Kim "Kim" Gee was seen and treated in our emergency department on 6/2/2024.  She may return to work on 06/04/2024.       If you have any questions or concerns, please don't hesitate to call.      iGna Pena, DO"

## 2024-06-02 NOTE — ED PROVIDER NOTES
Encounter Date: 2024    SCRIBE #1 NOTE: I, Kalli Montesinos, am scribing for, and in the presence of,  Gina Pena DO. I have scribed the following portions of the note - Other sections scribed: HPI, ROS, PE, MDM.       History     Chief Complaint   Patient presents with    Sore Throat     A 34 y/o female presents to the ER c/o sore throat since Thursday afternoon. Pt reports feeling warm and having body aches. No home temp obtained. +Pus to back of throat.      Kim Flynn is a 33 y.o. female who presents to the ED for chief complaint of a sore throat that began 3 days ago. Patient reports her aunt was recently diagnosed with strep throat. Patient reports associated body aches and neck pain. Patient denies nausea, vomiting, diarrhea, or ear pain. Patient reports attempting treatment with nasal spray, Dayquil, and Tylenol. Patient denies smoking, EtOH consumption, or recreational drug use. NKDA.     The history is provided by the patient. No  was used.     Review of patient's allergies indicates:  No Known Allergies  Past Medical History:   Diagnosis Date    Genital herpes      Past Surgical History:   Procedure Laterality Date    CERVIX SURGERY      biopsy, negative malignancy     SECTION  2016    WISDOM TOOTH EXTRACTION       Family History   Problem Relation Name Age of Onset    Hyperlipidemia Mother      Diabetes Mother      Sickle cell trait Mother      Diabetes Father      Drug abuse Father      Diabetes Sister      Diabetes Brother      Diabetes Brother      Sickle cell anemia Brother      Hyperlipidemia Daughter      Anemia Daughter      Diabetes Maternal Grandmother      Colon cancer Neg Hx      Breast cancer Neg Hx      Ovarian cancer Neg Hx      Heart attack Neg Hx      Heart disease Neg Hx      Stroke Neg Hx      Osteoporosis Neg Hx       Social History     Tobacco Use    Smoking status: Never    Smokeless tobacco: Never   Substance Use Topics    Alcohol use: No     Drug use: No     Review of Systems   Constitutional:  Negative for fever.   HENT:  Positive for sore throat. Negative for ear pain and rhinorrhea.    Eyes:  Negative for redness.   Respiratory:  Negative for shortness of breath.    Cardiovascular:  Negative for chest pain and leg swelling.   Gastrointestinal:  Negative for abdominal pain, diarrhea, nausea and vomiting.   Musculoskeletal:  Positive for myalgias and neck pain. Negative for back pain.   Skin:  Negative for rash.   Neurological:  Negative for syncope and headaches.   All other systems reviewed and are negative.      Physical Exam     Initial Vitals [06/02/24 1003]   BP Pulse Resp Temp SpO2   123/86 (!) 116 18 98.9 °F (37.2 °C) 97 %      MAP       --         Physical Exam    Nursing note and vitals reviewed.  Constitutional: She appears well-developed and well-nourished.   Patient gave consent to have physical exam performed.     HENT:   Head: Normocephalic and atraumatic.   Right Ear: External ear normal.   Left Ear: External ear normal.   Nose: Nose normal.   Mouth/Throat: Oropharyngeal exudate, posterior oropharyngeal edema and posterior oropharyngeal erythema present.   Eyes: Conjunctivae and EOM are normal. Pupils are equal, round, and reactive to light.   Neck: Phonation normal. Neck supple.   Normal range of motion.  Cardiovascular:  Normal rate, regular rhythm, normal heart sounds and intact distal pulses.     Exam reveals no gallop and no friction rub.       No murmur heard.  Pulmonary/Chest: Effort normal and breath sounds normal. No stridor. No respiratory distress. She has no wheezes. She has no rhonchi. She has no rales. She exhibits no tenderness.   Abdominal: Abdomen is soft. Bowel sounds are normal. She exhibits no distension. There is no abdominal tenderness. There is no rigidity, no rebound and no guarding.   Musculoskeletal:         General: No tenderness or edema. Normal range of motion.      Cervical back: Normal range of motion  and neck supple.     Lymphadenopathy:     She has cervical adenopathy.   Neurological: She is alert and oriented to person, place, and time. She has normal strength. No cranial nerve deficit or sensory deficit. GCS score is 15. GCS eye subscore is 4. GCS verbal subscore is 5. GCS motor subscore is 6.   Skin: Skin is warm and dry. Capillary refill takes less than 2 seconds. No rash noted.   Psychiatric: She has a normal mood and affect. Her behavior is normal.         ED Course   Procedures  Labs Reviewed   POCT STREP A, RAPID - Abnormal; Notable for the following components:       Result Value    POC Rapid Strep A positive (*)     All other components within normal limits   SARS-COV-2 RDRP GENE    Narrative:     This test utilizes isothermal nucleic acid amplification technology to detect the SARS-CoV-2 RdRp nucleic acid segment. The analytical sensitivity (limit of detection) is 500 copies/swab.     A POSITIVE result is indicative of the presence of SARS-CoV-2 RNA; clinical correlation with patient history and other diagnostic information is necessary to determine patient infection status.    A NEGATIVE result means that SARS-CoV-2 nucleic acids are not present above the limit of detection. A NEGATIVE result should be treated as presumptive. It does not rule out the possibility of COVID-19 and should not be the sole basis for treatment decisions. If COVID-19 is strongly suspected based on clinical and exposure history, re-testing using an alternate molecular assay should be considered.     Commercial kits are provided by [employer].   _________________________________________________________________   The authorized Fact Sheet for Healthcare Providers and the authorized Fact Sheet for Patients of the ID NOW COVID-19 are available on the FDA website:    https://www.fda.gov/media/345860/download      https://www.fda.gov/media/681482/download      POCT URINE PREGNANCY   POCT RAPID INFLUENZA A/B           Imaging Results    None          Medications   dexAMETHasone injection 12 mg (12 mg Intramuscular Given 6/2/24 1136)   ibuprofen tablet 600 mg (600 mg Oral Given 6/2/24 1135)     Medical Decision Making  Amount and/or Complexity of Data Reviewed  Labs: ordered.    Risk  OTC drugs.  Prescription drug management.    Medical Decision Making:    This is an evaluation of a 33 y.o. female that presents to the Emergency Department for   Chief Complaint   Patient presents with    Sore Throat     A 32 y/o female presents to the ER c/o sore throat since Thursday afternoon. Pt reports feeling warm and having body aches. No home temp obtained. +Pus to back of throat.      The patient is a non-toxic and well appearing patient. On physical exam, patient appears well hydrated with moist mucus membranes. Breath sounds are clear and equal bilaterally with no adventitious breath sounds, tachypnea or respiratory distress. Regular rate and rhythm. No murmurs. Abdomen soft and non tender. Patient is tolerating PO without difficulty. Physical exam otherwise as above.     I have reviewed vital signs and nursing notes.   Vital Signs Are Reassuring.     Based on the patient's symptoms, I am considering and evaluating for the following differential diagnoses: pregnancy, viral illness, strep, influenza, COVID.     ED Course:Treatment in the ED included Physical Exam and medications given in ED  Medications   dexAMETHasone injection 12 mg (12 mg Intramuscular Given 6/2/24 1136)   ibuprofen tablet 600 mg (600 mg Oral Given 6/2/24 1135)     Patient reports feeling better after treatment in the ER.   Vital signs reviewed  Nurse's notes reviewed  External Data/Documents Reviewed: Previous medical records and vital signs reviewed, see HPI and Physical exam.   Labs: ordered and reviewed.  Strep positive.  Pregnancy test negative, COVID negative, flu A negative, and flu B negative    Risk  Diagnosis or treatment significantly limited by the  following social determinants of health: Body mass index is 40.21 kg/m².     In shared decision making with the patient, we discussed treatment, prescriptions, labs, and imaging results.    Discharge home with   ED Prescriptions       Medication Sig Dispense Start Date End Date Auth. Provider    loratadine (CLARITIN) 10 mg tablet Take 1 tablet (10 mg total) by mouth once daily. 60 tablet 6/2/2024 6/2/2025 Gina Pena DO    fluticasone propionate (FLONASE) 50 mcg/actuation nasal spray 1 spray (50 mcg total) by Each Nostril route 2 (two) times daily. 16 g 6/2/2024 -- Gina Pena DO    acetaminophen (TYLENOL) 500 MG tablet Take 1 tablet (500 mg total) by mouth every 6 (six) hours as needed for Pain (and fever). 30 tablet 6/2/2024 -- Gina Pena DO    ibuprofen (ADVIL,MOTRIN) 600 MG tablet Take 1 tablet (600 mg total) by mouth every 6 (six) hours as needed for Pain (Take with food as needed for mild-to-moderate pain). 20 tablet 6/2/2024 -- Gina Pena DO    amoxicillin-clavulanate 875-125mg (AUGMENTIN) 875-125 mg per tablet Take 1 tablet by mouth 2 (two) times daily. for 10 days 20 tablet 6/2/2024 6/12/2024 Gina Pena DO          Fill and take prescriptions as directed.  Return to the ED if symptoms worsen or do not resolve.   Answered questions and discussed discharge plan.    Patient reports resolution of sore throat and is ready for discharge.  Follow up with PCP/specialist in 1 day    The following labs and imaging were reviewed:    Admission on 06/02/2024, Discharged on 06/02/2024   Component Date Value Ref Range Status    POC Rapid COVID 06/02/2024 Negative  Negative Final     Acceptable 06/02/2024 Yes   Final    POC Preg Test, Ur 06/02/2024 Negative  Negative Final     Acceptable 06/02/2024 Yes   Final    POC Rapid Strep A 06/02/2024 positive (A)  Positive/Negative Final    Influenza B Ag 06/02/2024 negative  Positive/Negative Final    Inflenza A Ag 06/02/2024 negative   Positive/Negative Final        Imaging Results    None               Scribe Attestation:   Scribe #1: I performed the above scribed service and the documentation accurately describes the services I performed. I attest to the accuracy of the note.                              I, Dr. Gina Pena, personally performed the services described in this documentation. This document was produced by a scribe under my direction and in my presence. All medical record entries made by the scribe were at my direction and in my presence.  I have reviewed the chart and agree that the record reflects my personal performance and is accurate and complete. Gina Pena DO.     06/02/2024 4:57 PM    Clinical Impression:  Final diagnoses:  [J02.0] Acute streptococcal pharyngitis (Primary)          ED Disposition Condition    Discharge Stable          ED Prescriptions       Medication Sig Dispense Start Date End Date Auth. Provider    loratadine (CLARITIN) 10 mg tablet Take 1 tablet (10 mg total) by mouth once daily. 60 tablet 6/2/2024 6/2/2025 Gina Pena DO    fluticasone propionate (FLONASE) 50 mcg/actuation nasal spray 1 spray (50 mcg total) by Each Nostril route 2 (two) times daily. 16 g 6/2/2024 -- Gina Pena DO    acetaminophen (TYLENOL) 500 MG tablet Take 1 tablet (500 mg total) by mouth every 6 (six) hours as needed for Pain (and fever). 30 tablet 6/2/2024 -- Gina Pena DO    ibuprofen (ADVIL,MOTRIN) 600 MG tablet Take 1 tablet (600 mg total) by mouth every 6 (six) hours as needed for Pain (Take with food as needed for mild-to-moderate pain). 20 tablet 6/2/2024 -- Gina Pena DO    amoxicillin-clavulanate 875-125mg (AUGMENTIN) 875-125 mg per tablet Take 1 tablet by mouth 2 (two) times daily. for 10 days 20 tablet 6/2/2024 6/12/2024 Gina Pena DO          Follow-up Information       Follow up With Specialties Details Why Contact Info    Rosa Bonenr MD Internal Medicine Schedule an appointment as soon  as possible for a visit in 1 day  [address]  [address]  [facility] [address]  [phone]      [facility] - Emergency Dept Emergency Medicine Go to  Please go to [facility] emergency department if symptoms worsen [address]  [facility] [place] [address]  [phone]             Gina Pena DO  06/02/24 0104

## 2024-06-02 NOTE — Clinical Note
"Kim "Kim" Gee was seen and treated in our emergency department on 6/2/2024.  She may return to work on 06/04/2024.       If you have any questions or concerns, please don't hesitate to call.      Gina Pena, DO"

## 2024-08-14 ENCOUNTER — HOSPITAL ENCOUNTER (EMERGENCY)
Facility: HOSPITAL | Age: 34
Discharge: HOME | End: 2024-08-14
Attending: STUDENT IN AN ORGANIZED HEALTH CARE EDUCATION/TRAINING PROGRAM
Payer: COMMERCIAL

## 2024-08-14 VITALS
WEIGHT: 237 LBS | DIASTOLIC BLOOD PRESSURE: 89 MMHG | RESPIRATION RATE: 18 BRPM | BODY MASS INDEX: 41.98 KG/M2 | OXYGEN SATURATION: 98 % | SYSTOLIC BLOOD PRESSURE: 140 MMHG | HEART RATE: 98 BPM | TEMPERATURE: 98 F

## 2024-08-14 DIAGNOSIS — R19.7 DIARRHEA, UNSPECIFIED TYPE: ICD-10-CM

## 2024-08-14 DIAGNOSIS — U07.1 COVID-19: Primary | ICD-10-CM

## 2024-08-14 LAB
B-HCG UR QL: NEGATIVE
CTP QC/QA: YES
CTP QC/QA: YES
INFLUENZA A ANTIGEN, POC: NEGATIVE
INFLUENZA B ANTIGEN, POC: NEGATIVE
POC RAPID STREP A: NEGATIVE
SARS-COV-2 RDRP RESP QL NAA+PROBE: POSITIVE

## 2024-08-14 PROCEDURE — 99284 EMERGENCY DEPT VISIT MOD MDM: CPT | Mod: ER

## 2024-08-14 PROCEDURE — 81025 URINE PREGNANCY TEST: CPT | Mod: ER | Performed by: STUDENT IN AN ORGANIZED HEALTH CARE EDUCATION/TRAINING PROGRAM

## 2024-08-14 PROCEDURE — 87880 STREP A ASSAY W/OPTIC: CPT | Mod: ER

## 2024-08-14 PROCEDURE — 87804 INFLUENZA ASSAY W/OPTIC: CPT | Mod: 59,ER

## 2024-08-14 PROCEDURE — 87635 SARS-COV-2 COVID-19 AMP PRB: CPT | Mod: ER | Performed by: STUDENT IN AN ORGANIZED HEALTH CARE EDUCATION/TRAINING PROGRAM

## 2024-08-14 RX ORDER — ONDANSETRON 4 MG/1
4 TABLET, ORALLY DISINTEGRATING ORAL EVERY 6 HOURS PRN
Qty: 12 TABLET | Refills: 0 | Status: SHIPPED | OUTPATIENT
Start: 2024-08-14 | End: 2024-08-17

## 2024-08-14 NOTE — ED PROVIDER NOTES
Encounter Date: 2024    SCRIBE #1 NOTE: I, Ruba English, am scribing for, and in the presence of,  Brittany Alba DO.       History     Chief Complaint   Patient presents with    Diarrhea     Diarrhea, congestion, sore throat x 1 day.      Kim lFynn is a 33 y.o. female, with a PMHx of pre-diabetes, on Metformin, who presents to the ED with diarrhea since this morning. Patient reports associated nausea and sore throat. No other exacerbating or alleviating factors. Denies vomiting, fever, chills, otalgia, CP, SOB, dysuria, hematuria, hematochezia, headache, abdominal pain, body aches or other associated symptoms. NKDA. Denies abdominal PSHx.       The history is provided by the patient. No  was used.     Review of patient's allergies indicates:  No Known Allergies  Past Medical History:   Diagnosis Date    Genital herpes      Past Surgical History:   Procedure Laterality Date    CERVIX SURGERY      biopsy, negative malignancy     SECTION  2016    WISDOM TOOTH EXTRACTION       Family History   Problem Relation Name Age of Onset    Hyperlipidemia Mother      Diabetes Mother      Sickle cell trait Mother      Diabetes Father      Drug abuse Father      Diabetes Sister      Diabetes Brother      Diabetes Brother      Sickle cell anemia Brother      Hyperlipidemia Daughter      Anemia Daughter      Diabetes Maternal Grandmother      Colon cancer Neg Hx      Breast cancer Neg Hx      Ovarian cancer Neg Hx      Heart attack Neg Hx      Heart disease Neg Hx      Stroke Neg Hx      Osteoporosis Neg Hx       Social History     Tobacco Use    Smoking status: Never    Smokeless tobacco: Never   Substance Use Topics    Alcohol use: No    Drug use: No     Review of Systems   Constitutional:  Negative for chills and fever.   HENT:  Positive for sore throat. Negative for congestion, ear pain and rhinorrhea.    Eyes:  Negative for visual disturbance.   Respiratory:  Negative for  cough, shortness of breath and wheezing.    Cardiovascular:  Negative for chest pain and palpitations.   Gastrointestinal:  Positive for diarrhea and nausea. Negative for abdominal pain, blood in stool, constipation and vomiting.   Genitourinary:  Negative for difficulty urinating, dysuria, frequency, hematuria, urgency and vaginal discharge.   Musculoskeletal:  Negative for back pain, myalgias and neck pain.   Skin:  Negative for rash.   Neurological:  Negative for dizziness, syncope, weakness, light-headedness, numbness and headaches.   Psychiatric/Behavioral:  Negative for confusion.        Physical Exam     Initial Vitals [08/14/24 1034]   BP Pulse Resp Temp SpO2   (!) 140/89 98 18 97.9 °F (36.6 °C) 99 %      MAP       --         Physical Exam    Nursing note and vitals reviewed.  Constitutional: She appears well-developed and well-nourished. She is not diaphoretic.  Non-toxic appearance. She does not have a sickly appearance. She does not appear ill.   Elevated BMI.   Body mass index is 41.98 kg/m².     HENT:   Head: Normocephalic and atraumatic.   Mouth/Throat: Oropharynx is clear and moist and mucous membranes are normal.   Eyes: Conjunctivae are normal. No scleral icterus.   Neck: Neck supple. No tracheal deviation present.   Normal range of motion.  Cardiovascular:  Normal rate, regular rhythm, normal heart sounds and intact distal pulses.           Pulmonary/Chest: Breath sounds normal. No stridor. No respiratory distress. She has no wheezes.   Abdominal: Abdomen is soft. Bowel sounds are normal. She exhibits no distension. There is no abdominal tenderness.   No right CVA tenderness.  No left CVA tenderness. There is no rebound and no guarding.   Musculoskeletal:         General: No tenderness or edema. Normal range of motion.      Cervical back: Normal range of motion and neck supple.     Neurological: She is alert and oriented to person, place, and time. She has normal strength. GCS score is 15. GCS eye  subscore is 4. GCS verbal subscore is 5. GCS motor subscore is 6.   Moves all extremities, follows all commands, no focal neurologic deficits.      Skin: Skin is warm and dry. Capillary refill takes less than 2 seconds. No rash noted. No erythema.   Psychiatric: She has a normal mood and affect. Thought content normal.         ED Course   Procedures  Labs Reviewed   SARS-COV-2 RDRP GENE - Abnormal       Result Value    POC Rapid COVID Positive (*)      Acceptable Yes      Narrative:     This test utilizes isothermal nucleic acid amplification technology to detect the SARS-CoV-2 RdRp nucleic acid segment. The analytical sensitivity (limit of detection) is 500 copies/swab.     A POSITIVE result is indicative of the presence of SARS-CoV-2 RNA; clinical correlation with patient history and other diagnostic information is necessary to determine patient infection status.    A NEGATIVE result means that SARS-CoV-2 nucleic acids are not present above the limit of detection. A NEGATIVE result should be treated as presumptive. It does not rule out the possibility of COVID-19 and should not be the sole basis for treatment decisions. If COVID-19 is strongly suspected based on clinical and exposure history, re-testing using an alternate molecular assay should be considered.     Commercial kits are provided by [employer]   _________________________________________________________________   The authorized Fact Sheet for Healthcare Providers and the authorized Fact Sheet for Patients of the ID NOW COVID-19 are available on the FDA website:    https://www.fda.gov/media/038314/download      https://www.fda.gov/media/127472/download      POCT URINE PREGNANCY    POC Preg Test, Ur Negative       Acceptable Yes     POCT STREP A, RAPID    POC Rapid Strep A negative     POCT RAPID INFLUENZA A/B    Influenza B Ag negative      Inflenza A Ag negative            Imaging Results    None           Medications - No data to display  Medical Decision Making   MDM  This is an emergent evaluation of a 33 y.o. female with diarrhea this morning. Initial vitals in the ED [08/14/24 1034]  BP: (!) 140/89  Pulse: 98  Resp: 18  Temp: 97.9 °F (36.6 °C)  SpO2: 99 % .     Physical exam noted above. DDx includes but is not limited to COVID vs influenza vs other viral illness, dehydration, electrolyte abnormality. Also considered but clinically less likely to be bowel obstruction, appendicitis, sepsis. Will obtain labs and imaging including UPT, COVID/Flu/Strep swabs. Will also provide zofran prescription. Will continue to monitor and frequently reassess pending results of labs, treatments and final disposition.    Patient is aware of plan and is amenable.     Brittany Alba D.O  EMERGENCY MEDICINE  11:58 AM 08/14/2024    UPDATE:  Labs reveal patient COVID positive. Vitals reassuring, including a pulse ox within normal limits. Patient asymptomatic at this time. She is non-toxic appearing. Will discharge with Zofran for nausea and Paxlovid. Will also discharge with PCP follow-up and ED return precautions. Patient is aware and agreeable to the plan.       This chart was completed using dictation software, as a result there may be some transcription errors      Amount and/or Complexity of Data Reviewed  Labs: ordered. Decision-making details documented in ED Course.    Risk  Prescription drug management.            Scribe Attestation:   Scribe #1: I performed the above scribed service and the documentation accurately describes the services I performed. I attest to the accuracy of the note.                             I, Brittany Alba DO, personally performed the services described in this documentation. All medical record entries made by the scribe were at my direction and in my presence. I have reviewed the chart and agree that the record reflects my personal performance and is accurate and  complete.      DISCLAIMER: This note was prepared with [employer] voice recognition transcription software. Garbled syntax, mangled pronouns, and other bizarre constructions may be attributed to that software system.    Clinical Impression:  Final diagnoses:  [R19.7] Diarrhea, unspecified type  [U07.1] COVID-19 (Primary)          ED Disposition Condition    Discharge Stable          ED Prescriptions       Medication Sig Dispense Start Date End Date Auth. Provider    ondansetron (ZOFRAN-ODT) 4 MG TbDL () Take 1 tablet (4 mg total) by mouth every 6 (six) hours as needed (nausea and vomiting). 12 tablet 2024 Brittany Alba, DO    nirmatrelvir-ritonavir 300 mg (150 mg x 2)-100 mg copackaged tablets (EUA) () Take 3 tablets by mouth 2 (two) times daily for 5 days. Each dose contains 2 nirmatrelvir (pink tablets) and 1 ritonavir (white tablet). Take all 3 tablets together 30 tablet 2024 Brittany Alba, DO          Follow-up Information    None          Brittany Alba,   [id number]

## 2024-08-14 NOTE — Clinical Note
"Kim "Marie Flynn was seen and treated in our emergency department on 8/14/2024.     COVID-19 is present in our communities across the state. There is limited testing for COVID at this time, so not all patients can be tested. In this situation, your employee meets the following criteria:    Kim Flynn has met the criteria for COVID-19 testing and has a POSITIVE result. She can return to work once they are asymptomatic for 24 hours without the use of fever reducing medications AND at least five days from the first positive result. A mask is recommended for 5 days post quarantine.     If you have any questions or concerns, or if I can be of further assistance, please do not hesitate to contact me.    Sincerely,             Brittany Alba, DO"

## 2024-08-14 NOTE — DISCHARGE INSTRUCTIONS
Thank you for coming to our Emergency Department today. It is important to remember that some problems or medical conditions are difficult to diagnose and may not be found during your Emergency Department visit.     Be sure to follow up with your primary care doctor and review all labs/imaging/tests that were performed during your ER visit with them. Some labs/tests may be outside of the normal range and require non-emergent follow-up and further investigation to help diagnose/exclude/prevent complications or other potentially serious medical conditions that were not addressed during your ER visit.    If you do not have a primary care doctor, you may contact the one listed on your discharge paperwork or you may also call the [facility] Appointment Desk at [phone] to schedule an appointment and establish care with one. It is important to your health that you have a primary care doctor.    Please take all medications as directed. All medications may potentially have side-effects and it is impossible to predict which medications may give you side-effects or what side-effects (if any) they will give you.. If you feel that you are having a negative effect or side-effect of any medication you should immediately stop taking them and seek medical attention. If you feel that you are having a life-threatening reaction call 911.    Return to the ER with any questions/concerns, new/concerning symptoms, worsening or failure to improve.     Do not drive, swim, climb to height, take a bath, operate heavy machinery, drink alcohol or take potentially sedating medications, sign any legal documents or make any important decisions for 24 hours if you have received any pain medications, sedatives or mood altering drugs during your ER visit or within 24 hours of taking them if they have been prescribed to you.     You can find additional resources for Dentists, hearing aids, durable medical equipment, low cost pharmacies and  other resources at [email or web address]    BELOW THIS LINE ONLY APPLIES IF YOU HAVE A COVID TEST PENDING OR IF YOU HAVE BEEN DIAGNOSED WITH COVID:  Please access [facility] to review the results of your test. Until the results of your COVID test return, you should isolate yourself so as not to potentially spread illness to others.   If your COVID test returns positive, you should isolate yourself so as not to spread illness to others. After five full days, if you are feeling better and you have not had fever for 24 hours, you can return to your typical daily activities, but you must wear a mask around others for an additional 5 days.   If your COVID test returns negative and you are either unvaccinated or more than six months out from your two-dose vaccine and are not yet boosted, you should still quarantine for 5 full days followed by strict mask use for an additional 5 full days.   If your COVID test returns negative and you have received your 2-dose initial vaccine as well as a booster, you should continue strict mask use for 10 full days after the exposure.  For all those exposed, best practice includes a test at day 5 after the exposure. This can be a home test or a test through one of the many testing centers throughout our community.   Masking is always advised to limit the spread of COVID. Cdc.gov is an excellent site to obtain the latest up to date recommendations regarding COVID and COVID testing.     CDC Testing and Quarantine Guidelines for patients with exposure to a known-positive COVID-19 person:  A close exposure is defined as anyone who has had an exposure (masked or unmasked) to a known COVID -19 positive person within 6 feet of someone for a cumulative total of 15 minutes or more over a 24-hour period.   Vaccinated and/or if you recently had a positive covid test within 90 days do NOT need to quarantine after contact with someone who had COVID-19 unless you develop symptoms.   Fully vaccinated  people who have not had a positive test within 90 days, should get tested 3-5 days after their exposure, even if they don't have symptoms and wear a mask indoors in public for 14 days following exposure or until their test result is negative.      Unvaccinated and/or NOT had a positive test within 90 days and meet close exposure  You are required by CDC guidelines to quarantine for at least 5 days from time of exposure followed by 5 days of strict masking. It is recommended, but not required to test after 5 days, unless you develop symptoms, in which case you should test at that time.  If you get tested after 5 days and your test is positive, your 5 day period of isolation starts the day of the positive test.    If your exposure does not meet the above definition, you can return to your normal daily activities to include social distancing, wearing a mask and frequent handwashing.      Here is a link to guidance from the CDC:  https://www.cdc.gov/media/releases/2021/s1227-isolation-quarantine-guidance.html      [place] Dept Of Health Testing Sites:  https://ldh.la.gov/page/3934      [facility] website with testing locations and guidance:  [email or web address]